# Patient Record
Sex: FEMALE | Race: WHITE | NOT HISPANIC OR LATINO | URBAN - NONMETROPOLITAN AREA
[De-identification: names, ages, dates, MRNs, and addresses within clinical notes are randomized per-mention and may not be internally consistent; named-entity substitution may affect disease eponyms.]

---

## 2018-01-25 ENCOUNTER — HOSPITAL ENCOUNTER (OUTPATIENT)
Dept: LAB | Facility: MEDICAL CENTER | Age: 63
End: 2018-01-25
Attending: NURSE PRACTITIONER
Payer: MEDICARE

## 2018-01-25 LAB
ALBUMIN SERPL BCP-MCNC: 4.4 G/DL (ref 3.2–4.9)
ALBUMIN/GLOB SERPL: 1.1 G/DL
ALP SERPL-CCNC: 83 U/L (ref 30–99)
ALT SERPL-CCNC: 20 U/L (ref 2–50)
ANION GAP SERPL CALC-SCNC: 10 MMOL/L (ref 0–11.9)
AST SERPL-CCNC: 25 U/L (ref 12–45)
BILIRUB SERPL-MCNC: 0.6 MG/DL (ref 0.1–1.5)
BUN SERPL-MCNC: 24 MG/DL (ref 8–22)
CALCIUM SERPL-MCNC: 9.5 MG/DL (ref 8.5–10.5)
CHLORIDE SERPL-SCNC: 106 MMOL/L (ref 96–112)
CHOLEST SERPL-MCNC: 169 MG/DL (ref 100–199)
CO2 SERPL-SCNC: 23 MMOL/L (ref 20–33)
CREAT SERPL-MCNC: 1.12 MG/DL (ref 0.5–1.4)
EST. AVERAGE GLUCOSE BLD GHB EST-MCNC: 117 MG/DL
GLOBULIN SER CALC-MCNC: 3.9 G/DL (ref 1.9–3.5)
GLUCOSE SERPL-MCNC: 96 MG/DL (ref 65–99)
HBA1C MFR BLD: 5.7 % (ref 0–5.6)
HCV AB SER QL: NEGATIVE
HDLC SERPL-MCNC: 56 MG/DL
HIV 1+2 AB+HIV1 P24 AG SERPL QL IA: NON REACTIVE
LDLC SERPL CALC-MCNC: 103 MG/DL
POTASSIUM SERPL-SCNC: 4 MMOL/L (ref 3.6–5.5)
PROT SERPL-MCNC: 8.3 G/DL (ref 6–8.2)
SODIUM SERPL-SCNC: 139 MMOL/L (ref 135–145)
TRIGL SERPL-MCNC: 50 MG/DL (ref 0–149)
TSH SERPL DL<=0.005 MIU/L-ACNC: 1.57 UIU/ML (ref 0.38–5.33)

## 2018-01-25 PROCEDURE — 80061 LIPID PANEL: CPT

## 2018-01-25 PROCEDURE — 87389 HIV-1 AG W/HIV-1&-2 AB AG IA: CPT

## 2018-01-25 PROCEDURE — 83036 HEMOGLOBIN GLYCOSYLATED A1C: CPT

## 2018-01-25 PROCEDURE — 36415 COLL VENOUS BLD VENIPUNCTURE: CPT

## 2018-01-25 PROCEDURE — 80053 COMPREHEN METABOLIC PANEL: CPT

## 2018-01-25 PROCEDURE — 84443 ASSAY THYROID STIM HORMONE: CPT

## 2018-01-25 PROCEDURE — 86803 HEPATITIS C AB TEST: CPT

## 2018-09-18 ENCOUNTER — TELEPHONE (OUTPATIENT)
Dept: CARDIOLOGY | Facility: MEDICAL CENTER | Age: 63
End: 2018-09-18

## 2018-09-18 NOTE — TELEPHONE ENCOUNTER
LVM asking patient to call back into office to find out if she has had any recent blood work done or if she has ever seen a prior cardiologist for cardiac care. Patient has a NP apt. With Dr. Saunders in [place] on 9/20/2018 @ 10:00am*BESSY

## 2018-09-19 NOTE — TELEPHONE ENCOUNTER
Received call back from patient. Patient states she has not had any recent blood work done or any recent cardiac testing done. All she has been having done in regards to labs have been INR checks.*BESSY

## 2018-09-20 ENCOUNTER — OFFICE VISIT (OUTPATIENT)
Dept: CARDIOLOGY | Facility: PHYSICIAN GROUP | Age: 63
End: 2018-09-20
Payer: COMMERCIAL

## 2018-09-20 VITALS
OXYGEN SATURATION: 97 % | HEIGHT: 62 IN | DIASTOLIC BLOOD PRESSURE: 80 MMHG | BODY MASS INDEX: 40.67 KG/M2 | SYSTOLIC BLOOD PRESSURE: 142 MMHG | HEART RATE: 70 BPM | WEIGHT: 221 LBS

## 2018-09-20 DIAGNOSIS — I82.503 CHRONIC DEEP VEIN THROMBOSIS (DVT) OF BOTH LOWER EXTREMITIES, UNSPECIFIED VEIN (HCC): ICD-10-CM

## 2018-09-20 PROBLEM — I82.409 DVT (DEEP VENOUS THROMBOSIS) (HCC): Status: ACTIVE | Noted: 2018-09-20

## 2018-09-20 PROCEDURE — 99204 OFFICE O/P NEW MOD 45 MIN: CPT | Performed by: INTERNAL MEDICINE

## 2018-09-20 RX ORDER — AMLODIPINE BESYLATE 10 MG/1
TABLET ORAL
Refills: 3 | COMMUNITY
Start: 2018-07-26

## 2018-09-20 ASSESSMENT — ENCOUNTER SYMPTOMS
SPUTUM PRODUCTION: 0
NEUROLOGICAL NEGATIVE: 1
PND: 0
GASTROINTESTINAL NEGATIVE: 1
BRUISES/BLEEDS EASILY: 0
CONSTITUTIONAL NEGATIVE: 1
MUSCULOSKELETAL NEGATIVE: 1
ORTHOPNEA: 0
CHILLS: 0
SHORTNESS OF BREATH: 0
WEAKNESS: 0
LOSS OF CONSCIOUSNESS: 0
DIZZINESS: 0
WHEEZING: 0
RESPIRATORY NEGATIVE: 1
FEVER: 0
HEMOPTYSIS: 0
SORE THROAT: 0
EYES NEGATIVE: 1
COUGH: 0
CLAUDICATION: 0
PALPITATIONS: 0
CARDIOVASCULAR NEGATIVE: 1
STRIDOR: 0

## 2018-09-20 NOTE — PROGRESS NOTES
No chief complaint on file.      Subjective:   Leta Alegria is a 62 y.o. female who presents today as a new consultation for blood clots and INR monitoring.  She is a 62-year-old female who had her first blood clot in the setting of her knee replacement surgery approximately 10 years ago.  She continued to have bilateral DVTs per her report and was eventually tested for factor V Leiden deficiency which was positive.  She also has a family history of blood clotting disorders in her mother.  She has been on Coumadin since that time.  She has never had a blood clot that was not provoked.  She also has hyperlipidemia which is borderline controlled as well as high blood pressure which is controlled.  She does not smoke.    Past Medical History:   Diagnosis Date   • Chronic anticoagulation 6/28/2010   • Knee pain, bilateral 6/28/2010   • Pure hypercholesterolemia 6/28/2010   • Shoulder pain, right 6/28/2010     Past Surgical History:   Procedure Laterality Date   • KNEE REVISION TOTAL  6/5/08    Performed by FERNANDA SORENSEN at SURGERY [facility] ORS   • KNEE REVISION TOTAL  6/5/08    Performed by FERNANDA SORENSEN at SURGERY [facility] ORS   • KNEE ARTHROPLASTY TOTAL  4/2006    right   • KNEE ARTHROPLASTY TOTAL  12/2002    left   • KNEE ARTHROPLASTY TOTAL  12/2001    left   • APPENDECTOMY     • CERVICAL FUSION POSTERIOR      C4-6   • [place] BY LAPAROSCOPY     • HYSTERECTOMY, TOTAL ABDOMINAL      with bso due to bleeding   • ROTATOR CUFF REPAIR      left   • TMJ ARTHROSCOPY      bilateral   • TONSILLECTOMY     • VENTRAL HERNIA REPAIR       Family History   Problem Relation Age of Onset   • Lung Disease Mother         Emphysema   • Heart Disease Mother         CHF   • Arthritis Father    • Cancer Father         lung cancer     Social History     Social History   • Marital status:      Spouse name: N/A   • Number of children: N/A   • Years of education: N/A     Occupational History   • Not on file.     Social  History Main Topics   • Smoking status: Never Smoker   • Smokeless tobacco: Never Used   • Alcohol use No   • Drug use: No   • Sexual activity: No      Comment:      Other Topics Concern   • Not on file     Social History Narrative   • No narrative on file     Allergies   Allergen Reactions   • Claritin [Loratadine]    • Iodex [Iodine-Kelp]    • Lovenox [Enoxaparin Sodium]    • Valium [Diazepam]      Outpatient Encounter Prescriptions as of 9/20/2018   Medication Sig Dispense Refill   • amLODIPine (NORVASC) 10 MG Tab TAKE 1 TABLET BY MOUTH DAILY  BRING ALL MEDICATIONS TO EVERY APPOINTMENT  3   • KETOPROFEN 0.5 Inches by Does not apply route as needed. For pain      • LISINOPRIL 20 MG TABS Take 20 mg by mouth every day.     • LOVASTATIN 20 MG TABS Take 20 mg by mouth every evening.     • COUMADIN 5 MG TABS Take 5 mg by mouth every day.     • WARFARIN 1 MG TABS Take 1 mg by mouth every day.     • TIZANIDINE 2 MG tablet Take 2 mg by mouth 3 times a day.     • AMITRIPTYLINE 25 MG TABS Take 25 mg by mouth every evening.     • NORCO  MG TABS Take 1-2 Tabs by mouth every 6 hours as needed for Mild Pain.     • LIDODERM 5 % PTCH Apply 1 Patch to skin as directed every 24 hours.     • amoxicillin (AMOXIL) 875 MG tablet Take 1 Tab by mouth 2 times a day. 20 Tab 0   • fluoxetine (PROZAC) 10 MG CAPS Take 10 mg by mouth every day.     • GABAPENTIN 300 MG CAPS Take 300 mg by mouth 3 times a day.     • METHADONE 10 MG TABS Take 10 mg by mouth every four hours as needed for Mild Pain.       No facility-administered encounter medications on file as of 9/20/2018.      Review of Systems   Constitutional: Negative.  Negative for chills, fever and malaise/fatigue.   HENT: Negative.  Negative for sore throat.    Eyes: Negative.    Respiratory: Negative.  Negative for cough, hemoptysis, sputum production, shortness of breath, wheezing and stridor.    Cardiovascular: Negative.  Negative for chest pain, palpitations, orthopnea,  "claudication, leg swelling and PND.   Gastrointestinal: Negative.    Genitourinary: Negative.    Musculoskeletal: Negative.    Skin: Negative.    Neurological: Negative.  Negative for dizziness, loss of consciousness and weakness.   Endo/Heme/Allergies: Negative.  Does not bruise/bleed easily.   All other systems reviewed and are negative.       Objective:   /80 (BP Location: Right arm, Patient Position: Sitting, BP Cuff Size: Adult)   Pulse 70   Ht 1.575 m (5' 2\")   Wt 100.2 kg (221 lb)   SpO2 97%   BMI 40.42 kg/m²     Physical Exam   Constitutional: She appears well-developed and well-nourished. No distress.   HENT:   Head: Normocephalic and atraumatic.   Right Ear: External ear normal.   Left Ear: External ear normal.   Nose: Nose normal.   Mouth/Throat: No oropharyngeal exudate.   Eyes: Pupils are equal, round, and reactive to light. Conjunctivae and EOM are normal. Right eye exhibits no discharge. Left eye exhibits no discharge. No scleral icterus.   Neck: Neck supple. No JVD present.   Cardiovascular: Normal rate, regular rhythm and intact distal pulses.  Exam reveals no gallop and no friction rub.    No murmur heard.  Pulmonary/Chest: Effort normal. No stridor. No respiratory distress. She has no wheezes. She has no rales. She exhibits no tenderness.   Abdominal: Soft. She exhibits no distension. There is no guarding.   Musculoskeletal: Normal range of motion. She exhibits no edema, tenderness or deformity.   Neurological: She is alert. She has normal reflexes. She displays normal reflexes. No cranial nerve deficit. She exhibits normal muscle tone. Coordination normal.   Skin: Skin is warm and dry. No rash noted. She is not diaphoretic. No erythema. No pallor.   Psychiatric: She has a normal mood and affect. Her behavior is normal. Judgment and thought content normal.   Nursing note and vitals reviewed.      Assessment:     1. Chronic deep vein thrombosis (DVT) of both lower extremities, " unspecified vein (HCC)         Medical Decision Making:  Today's Assessment / Status / Plan:     62-year-old female with what clots in the setting of surgery and immobilization which would be a provoked DVT.  She has never had an unprovoked DVT and therefore the finding of the factor V Leiden may represent a red herring in this case.  I would like her to see hematology to further discuss whether not stopping the Coumadin would be appropriate for her.  I will also refer her to the Coumadin management clinic for management of her INRs or consideration of switching to an oral anticoagulant.  She does not need follow-up in cardiology clinic.    Thank for you allowing me to take part in your patient's care, please call should you have any questions or would like to discuss this patient.

## 2018-09-20 NOTE — LETTER
[facility] [address]  Phone: [phone]  Fax: [phone]              Leta Alegria  1955    Encounter Date: 9/20/2018    Florentino Saunders M.D.          PROGRESS NOTE:  No notes on file      Pcp Pt States None

## 2018-09-21 DIAGNOSIS — Z79.01 CHRONIC ANTICOAGULATION: ICD-10-CM

## 2018-09-28 ENCOUNTER — ANTICOAGULATION MONITORING (OUTPATIENT)
Dept: VASCULAR LAB | Facility: MEDICAL CENTER | Age: 63
End: 2018-09-28

## 2018-09-28 DIAGNOSIS — Z79.01 CHRONIC ANTICOAGULATION: ICD-10-CM

## 2018-09-28 DIAGNOSIS — I82.503 CHRONIC DEEP VEIN THROMBOSIS (DVT) OF BOTH LOWER EXTREMITIES, UNSPECIFIED VEIN (HCC): ICD-10-CM

## 2018-09-28 LAB — INR PPP: 1.6 (ref 2–3.5)

## 2018-09-28 RX ORDER — WARFARIN SODIUM 5 MG/1
5 TABLET ORAL DAILY
Qty: 30 TAB | Refills: 3 | Status: SHIPPED | OUTPATIENT
Start: 2018-09-28 | End: 2019-01-17 | Stop reason: SDUPTHER

## 2018-09-28 RX ORDER — WARFARIN SODIUM 1 MG/1
2-4 TABLET ORAL DAILY
Qty: 60 TAB | Refills: 3 | Status: SHIPPED | OUTPATIENT
Start: 2018-09-28 | End: 2019-02-21 | Stop reason: SDUPTHER

## 2018-09-28 NOTE — PROGRESS NOTES
.  Anticoagulation Summary  As of 2018    INR goal:   2.0-3.0   TTR:   --   Today's INR:   1.6!   Warfarin maintenance plan:   7 mg (1 mg x 2 and 5 mg x 1) every day   Weekly warfarin total:   49 mg   Plan last modified:   Leonarda Curry, PharmD (2018)   Next INR check:   10/5/2018   Target end date:   Indefinite    Indications    DVT (deep venous thrombosis) (HCC) [I82.409]  Chronic anticoagulation [Z79.01]             Anticoagulation Episode Summary     INR check location:       Preferred lab:       Send INR reminders to:       Comments:         Anticoagulation Care Providers     Provider Role Specialty Phone number    Florentino Saunders M.D. Referring Cardiology [phone]    [facility] Anticoagulation Services Responsible  [phone]        Anticoagulation Patient Findings    Pt hx - Factor V Liden, DVT, pt's been on warfarin prior to coming to [facility].    Pt was very short on the phone. States that she was at a  and missed a few doses.   States that she uses BRAND Coumadin 5mg tablets and Warfarin 1mg tablets. Normally takes 7mg daily.     Will take 10mg x1 today and then resume her normal dosing. She will retest her INR in 1 week.     She has an appt to establish care in [facility] on 10/12/18.    Leonarda Curry, PharmD

## 2018-10-03 ENCOUNTER — TELEPHONE (OUTPATIENT)
Dept: HEMATOLOGY ONCOLOGY | Facility: MEDICAL CENTER | Age: 63
End: 2018-10-03

## 2018-10-03 NOTE — TELEPHONE ENCOUNTER
We received a referral for hematology from [employer] for chronic DVTs.  I spoke to the practice manager, Nelda Rodriguez and at this time we do not accept workman's compensation cases for hematology.  Faxed a note to Ruthy Javed with this updated information and advised them to re-route to another office.

## 2018-10-08 LAB — INR PPP: 1.6 (ref 2–3.5)

## 2018-10-08 RX ORDER — WARFARIN SODIUM 1 MG/1
2-4 TABLET ORAL DAILY
Qty: 60 TAB | Refills: 3 | Status: CANCELLED | OUTPATIENT
Start: 2018-10-08

## 2018-10-09 ENCOUNTER — ANTICOAGULATION MONITORING (OUTPATIENT)
Dept: VASCULAR LAB | Facility: MEDICAL CENTER | Age: 63
End: 2018-10-09

## 2018-10-09 DIAGNOSIS — Z79.01 CHRONIC ANTICOAGULATION: ICD-10-CM

## 2018-10-09 DIAGNOSIS — I82.503 CHRONIC DEEP VEIN THROMBOSIS (DVT) OF BOTH LOWER EXTREMITIES, UNSPECIFIED VEIN (HCC): ICD-10-CM

## 2018-10-09 NOTE — PROGRESS NOTES
Anticoagulation Summary  As of 10/9/2018    INR goal:   2.0-3.0   TTR:   --   Today's INR:   1.6! (10/8/2018)   Warfarin maintenance plan:   10 mg (5 mg x 2) on Tue, Thu; 7 mg (1 mg x 2 and 5 mg x 1) all other days   Weekly warfarin total:   55 mg   Plan last modified:   Marissa Nayak (10/9/2018)   Next INR check:   10/15/2018   Target end date:   Indefinite    Indications    DVT (deep venous thrombosis) (HCC) [I82.409]  Chronic anticoagulation [Z79.01]             Anticoagulation Episode Summary     INR check location:       Preferred lab:       Send INR reminders to:       Comments:         Anticoagulation Care Providers     Provider Role Specialty Phone number    Florentino Saunders M.D. Referring Cardiology [phone]    [facility] Responsible  [phone]        Anticoagulation Patient Findings   Left voicemail message to report a sub therapeutic INR of 1.6.  Pt to INCREASE WEEKLY DOSE BY 12%continue with current warfarin dosing regimen. Requested pt contact the clinic for any s/s of unusual bleeding, bruising, clotting or any changes to diet or medication.  Follow up in 1 weeks, to reduce the risk of adverse events related to this high risk medication, warfarin.    Marissa Nayak, Clinical Pharmacist

## 2018-10-12 ENCOUNTER — ANTICOAGULATION VISIT (OUTPATIENT)
Dept: MEDICAL GROUP | Facility: PHYSICIAN GROUP | Age: 63
End: 2018-10-12
Payer: COMMERCIAL

## 2018-10-12 DIAGNOSIS — Z79.01 CHRONIC ANTICOAGULATION: ICD-10-CM

## 2018-10-12 PROCEDURE — 99999 PR NO CHARGE: CPT | Performed by: FAMILY MEDICINE

## 2018-10-12 NOTE — PROGRESS NOTES
OP Anticoagulation Service Note    Date: 10/12/2018  There were no vitals filed for this visit.    Anticoagulation Summary  As of 10/12/2018    INR goal:   2.0-3.0   TTR:   --   Today's INR:      Warfarin maintenance plan:   10 mg (5 mg x 2) on Tue, Thu; 7 mg (1 mg x 2 and 5 mg x 1) all other days   Weekly warfarin total:   55 mg   Plan last modified:   Marissa M Filter (10/9/2018)   Next INR check:   10/15/2018   Target end date:   Indefinite    Indications    DVT (deep venous thrombosis) ([facility]) [I82.409]  Chronic anticoagulation [Z79.01]             Anticoagulation Episode Summary     INR check location:       Preferred lab:       Send INR reminders to:       Comments:         Anticoagulation Care Providers     Provider Role Specialty Phone number    Florentino Saunders M.D. Referring Cardiology [phone]    [facility] Responsible  [phone]        Anticoagulation Patient Findings      HPI:   Leta Alegria seen in clinic today, they are here today for a INR check on anticoagulation therapy with warfarin because they have deep vein thrombosis, we are also going to assess if a DOAC is appropriate to switch her to.    The reason for today's visit is to prevent morbidity and mortality from a blood clot and to reduce the risk of bleeding while on a anticoagulant.     Additional education provided today regarding reducing bleed risk and dietary constraints:  About how vitamin K and foods work with warfarin and the bleeding risk on a anticoagulant     Any upcoming procedures:   none    Confirmed warfarin dosing regimen  Interval Changes with foods rich in vitamin K: No  Interval Changes in ETOH:   No  Interval Changes in smoking status:  No  Interval Changes in medication:  No   Cost restriction:  No    S/S of bleeding or bruising:  No  Signs/symptoms  thrombosis since the last appt:  No  Bleed risk is:  moderate,     3 vitals included with today's appt : She declined vitals today.  (BP, HR,  weight, ht, RR)     Assessment:   She declined a point-of-care finger poke today.  She prefers going to the lab for a venipuncture.  She will get another INR on Monday.  She is resistant to using a DOAC.  We talked about the pros and cons her daughter is a physician and she will ask her daughter about it.    She is a good candidate to use a DOAC.  Her creatinine clearance is within normal limits, no drug interactions with Eliquis or Xarelto.    They have a TTR of 0 which is not at target (TTR target/goal is 100%) and requires close follow up to prevent a adverse event (the lower the TTR the higher risk of clots, strokes, or bleeding).       Plan:  Continue the weekly warfarin dose as noted    Follow up:  Follow up appointment in 1 week via the lab      Other info:  Pt educated to contact our clinic with any changes in medications or s/s of bleeding or thrombosis    CHEST guidelines recommend frequent INR monitoring at regular intervals (a few days up to a max of 12 weeks) to ensure they are on the proper dose of warfarin and not having any complications from therapy.  INRs can dramatically change over a short time period due to diet, medications, and medical conditions.

## 2018-10-18 ENCOUNTER — TELEPHONE (OUTPATIENT)
Dept: CARDIOLOGY | Facility: MEDICAL CENTER | Age: 63
End: 2018-10-18

## 2018-10-18 NOTE — TELEPHONE ENCOUNTER
----- Message from Lila Weber sent at 10/18/2018  2:33 PM PDT -----  Regarding: REFERRAL TO HEMATOLOGY ONCOLOGY  Dr. Nicky Altamirano referred this patient to Hematology/Oncology through [employer] and the Workers[employer] comp company tried to make appointments with at least 5 Hematology/Oncology offices and they don't take [employer].  Katherine at the [employer] office wants to know if Dr. Saunders can call [facility] Oncology and see if they will take W/[facility].  Any suggestions? If you have any other questions please let me know.     Please let me know the status as I need to let them know what to do.     Thanks,     Lila   [phone]

## 2018-10-25 ENCOUNTER — ANTICOAGULATION MONITORING (OUTPATIENT)
Dept: VASCULAR LAB | Facility: MEDICAL CENTER | Age: 63
End: 2018-10-25

## 2018-10-25 DIAGNOSIS — Z79.01 CHRONIC ANTICOAGULATION: ICD-10-CM

## 2018-10-25 LAB — INR PPP: 1.9 (ref 2–3.5)

## 2018-10-25 NOTE — TELEPHONE ENCOUNTER
[employer] calling about referral   Received: Today   Message Contents   CELESTINE Olvera/Adarsh Murphy at the [employer] office is calling again about referral. She said she has left several message for Josey Sharp and Eduardo Tan and no one has called her back. She is asking for a call back from Dr. Saunders about the referral. She can be reached at [phone].      ===========================  Call printed and given to Eduardo Tan for follow up.

## 2018-10-25 NOTE — PROGRESS NOTES
Anticoagulation Summary  As of 10/25/2018    INR goal:   2.0-3.0   TTR:   0.0 % (2.4 wk)   Today's INR:   1.9!   Warfarin maintenance plan:   10 mg (5 mg x 2) on Tue, Thu; 7 mg (1 mg x 2 and 5 mg x 1) all other days   Weekly warfarin total:   55 mg   Plan last modified:   Marissa M Filter (10/9/2018)   Next INR check:   11/8/2018   Target end date:   Indefinite    Indications    DVT (deep venous thrombosis) ([facility]) [I82.409]  Chronic anticoagulation [Z79.01]             Anticoagulation Episode Summary     INR check location:       Preferred lab:       Send INR reminders to:       Comments:         Anticoagulation Care Providers     Provider Role Specialty Phone number    Florentino Saunders M.D. Referring Cardiology [phone]    [facility] Responsible  [phone]        Anticoagulation Patient Findings  Patient Findings     Negatives:   Signs/symptoms of thrombosis, Signs/symptoms of bleeding, Laboratory test error suspected, Change in health, Change in alcohol use, Change in activity, Upcoming invasive procedure, Emergency department visit, Upcoming dental procedure, Missed doses, Extra doses, Change in medications, Change in diet/appetite, Hospital admission, Bruising, Other complaints        Spoke with the patient on the phone today, reporting a SUB-therapeutic INR Of 1.9.  Confirmed the current warfarin dosing regimen and patient compliance. Patient denies any interval changes to diet and/or medications. Patient denies any signs/symptoms of bleeding or clotting.  Patient instructed to take additional 2.5mg along with current dose (for a total of 12.5mg) TONIGHT ONLY, then to resume her current dosing regimen. Patient asked to follow up again in 2 weeks.    Jenise ChandraD

## 2018-11-21 ENCOUNTER — ANTICOAGULATION MONITORING (OUTPATIENT)
Dept: VASCULAR LAB | Facility: MEDICAL CENTER | Age: 63
End: 2018-11-21

## 2018-11-21 DIAGNOSIS — Z79.01 CHRONIC ANTICOAGULATION: ICD-10-CM

## 2018-11-21 LAB — INR PPP: 1.7 (ref 2–3.5)

## 2018-11-22 NOTE — PROGRESS NOTES
Anticoagulation Summary  As of 11/21/2018    INR goal:   2.0-3.0   TTR:   0.0 % (1.5 mo)   Today's INR:   1.7!   Warfarin maintenance plan:   10 mg (5 mg x 2) on Tue, Thu, Sat; 7 mg (1 mg x 2 and 5 mg x 1) all other days   Weekly warfarin total:   58 mg   Plan last modified:   Rommel Randall, PharmD (11/21/2018)   Next INR check:   12/5/2018   Target end date:   Indefinite    Indications    DVT (deep venous thrombosis) (HCC) [I82.409]  Chronic anticoagulation [Z79.01]             Anticoagulation Episode Summary     INR check location:       Preferred lab:       Send INR reminders to:       Comments:         Anticoagulation Care Providers     Provider Role Specialty Phone number    Florentino Saunders M.D. Referring Cardiology [phone]    [facility] Anticoagulation Services Responsible  [phone]        Anticoagulation Patient Findings    Left voicemail message to report a subtherapeutic INR of 1.7.  Requested pt contact the clinic for any s/s of unusual bleeding, bruising, clotting or any changes to diet or medication.   Instructed patient to increase weekly warfarin regimen by ~6% as detailed above.  FU 2 weeks.  Rommel Randall, PharmD

## 2018-12-14 ENCOUNTER — ANTICOAGULATION MONITORING (OUTPATIENT)
Dept: VASCULAR LAB | Facility: MEDICAL CENTER | Age: 63
End: 2018-12-14

## 2018-12-14 DIAGNOSIS — Z79.01 CHRONIC ANTICOAGULATION: ICD-10-CM

## 2018-12-14 LAB — INR PPP: 1.5 (ref 2–3.5)

## 2018-12-14 NOTE — PROGRESS NOTES
Anticoagulation Summary  As of 12/14/2018    INR goal:   2.0-3.0   TTR:   0.0 % (2.2 mo)   Today's INR:   1.5!   Warfarin maintenance plan:   10 mg (5 mg x 2) on Mon, Wed, Fri; 8 mg (1 mg x 3 and 5 mg x 1) all other days   Weekly warfarin total:   62 mg   Plan last modified:   Min Butt, Pharmacy Intern (12/14/2018)   Next INR check:   12/28/2018   Target end date:   Indefinite    Indications    DVT (deep venous thrombosis) (HCC) [I82.409]  Chronic anticoagulation [Z79.01]             Anticoagulation Episode Summary     INR check location:       Preferred lab:       Send INR reminders to:       Comments:         Anticoagulation Care Providers     Provider Role Specialty Phone number    Florentino Saunders M.D. Referring Cardiology [phone]    [facility] Anticoagulation Services Responsible  [phone]        Anticoagulation Patient Findings        Tried calling patient but had to leave a message.  INR is sub-therapeutic.   Pt was asked to call us if they have had any s/s of bleeding, or if they have had any medication changes.    Verified current warfarin regimen in the message.   Plan: Bolus today with 12.5 mg (instead of normal 10mg dose), then start new increased warfarin regimen outlined above.  This new regimen represents a 6.9% increase from the previous regimen.         Repeat INR in 2 weeks.    Min Butt 2019 PharmD Candidate

## 2019-01-02 ENCOUNTER — TELEPHONE (OUTPATIENT)
Dept: CARDIOLOGY | Facility: MEDICAL CENTER | Age: 64
End: 2019-01-02

## 2019-01-02 NOTE — TELEPHONE ENCOUNTER
Called and explained that we did not have any imaging of the pt's DVTs. Ron states understanding.

## 2019-01-02 NOTE — TELEPHONE ENCOUNTER
----- Message from Angel Nieves, Med Ass't sent at 1/2/2019 11:48 AM PST -----  Regarding: [employer] needs imaging for RO referral   Contact: [phone]  MEÑO Bustillo pt Ron was referred to Hemotology and they are stating that they need imaging or some sort of proof of DVT in her legs or they will have to cancel her appt. Ron did request a call back at the earliest convenience to [phone]    Thanks!  Angel [phone]

## 2019-01-29 ENCOUNTER — ANTICOAGULATION MONITORING (OUTPATIENT)
Dept: MEDICAL GROUP | Facility: MEDICAL CENTER | Age: 64
End: 2019-01-29

## 2019-01-29 DIAGNOSIS — Z79.01 CHRONIC ANTICOAGULATION: ICD-10-CM

## 2019-01-29 LAB — INR PPP: 1.9 (ref 2–3.5)

## 2019-01-29 NOTE — PROGRESS NOTES
Anticoagulation Summary  As of 1/29/2019    INR goal:   2.0-3.0   TTR:   0.0 % (3.8 mo)   Today's INR:   1.9!   Warfarin maintenance plan:   10 mg (5 mg x 2) on Mon, Wed, Fri; 8 mg (1 mg x 3 and 5 mg x 1) all other days   Weekly warfarin total:   62 mg   Plan last modified:   Min Butt, Pharmacy Intern (12/14/2018)   Next INR check:   2/12/2019   Target end date:   Indefinite    Indications    DVT (deep venous thrombosis) (HCC) [I82.409]  Chronic anticoagulation [Z79.01]             Anticoagulation Episode Summary     INR check location:       Preferred lab:       Send INR reminders to:       Comments:         Anticoagulation Care Providers     Provider Role Specialty Phone number    Florentino Saunders M.D. Referring Cardiology [phone]    [facility] Responsible  [phone]        Anticoagulation Patient Findings      Spoke with patient to report a SUBtherapeutic INR.    Pt instructed to continue with current warfarin dosing regimen, confirms dosing.   Per CHEST guidelines, no dose change warranted if INR is 0.1 points out of range.   Pt denies any s/s of bleeding, bruising, clotting or any changes to medication.    Pt reports she has been eating more cabbage.  Will follow up in 1 week(s).    Was considering dose increase to 10 mg M,W,F; 9 mg all other days (~6% weekly increase) but patient was resistant to that idea. She said she will try to be more consistent with diet and then check back in 1 week and if still SUBtherapeutic she will be willing to change her weekly dose.     Discussed with Marissa Whitman, Pharmacy Intern

## 2019-01-30 RX ORDER — WARFARIN SODIUM 5 MG/1
5-10 TABLET ORAL DAILY
Qty: 60 TAB | Refills: 3 | Status: SHIPPED | OUTPATIENT
Start: 2019-01-30 | End: 2019-02-08 | Stop reason: SDUPTHER

## 2019-02-08 DIAGNOSIS — Z79.01 CHRONIC ANTICOAGULATION: ICD-10-CM

## 2019-02-08 RX ORDER — WARFARIN SODIUM 5 MG/1
5-10 TABLET ORAL DAILY
Qty: 60 TAB | Refills: 3 | Status: SHIPPED | OUTPATIENT
Start: 2019-02-08 | End: 2019-02-21 | Stop reason: SDUPTHER

## 2019-02-21 DIAGNOSIS — Z79.01 CHRONIC ANTICOAGULATION: ICD-10-CM

## 2019-02-21 RX ORDER — WARFARIN SODIUM 5 MG/1
5-10 TABLET ORAL DAILY
Qty: 60 TAB | Refills: 3 | Status: SHIPPED | OUTPATIENT
Start: 2019-02-21 | End: 2019-05-30

## 2019-02-21 RX ORDER — WARFARIN SODIUM 1 MG/1
TABLET ORAL
Qty: 90 TAB | Refills: 1 | Status: SHIPPED | OUTPATIENT
Start: 2019-02-21 | End: 2019-05-28 | Stop reason: SDUPTHER

## 2019-02-25 ENCOUNTER — TELEPHONE (OUTPATIENT)
Dept: VASCULAR LAB | Facility: MEDICAL CENTER | Age: 64
End: 2019-02-25

## 2019-03-02 LAB — INR PPP: 1.5 (ref 2–3.5)

## 2019-03-04 ENCOUNTER — ANTICOAGULATION MONITORING (OUTPATIENT)
Dept: VASCULAR LAB | Facility: MEDICAL CENTER | Age: 64
End: 2019-03-04

## 2019-03-04 DIAGNOSIS — Z79.01 CHRONIC ANTICOAGULATION: ICD-10-CM

## 2019-03-04 NOTE — PROGRESS NOTES
"    Anticoagulation Summary  As of 3/4/2019    INR goal:   2.0-3.0   TTR:   0.0 % (4.8 mo)   INR used for dosin.5! (3/2/2019)   Warfarin maintenance plan:   10 mg (5 mg x 2) every Mon, Wed, Fri; 8 mg (1 mg x 3 and 5 mg x 1) all other days   Weekly warfarin total:   62 mg   Plan last modified:   Terrance Miller, Pharmacy Intern (3/4/2019)   Next INR check:   3/11/2019   Target end date:   Indefinite    Indications    DVT (deep venous thrombosis) (HCC) [I82.409]  Chronic anticoagulation [Z79.01]             Anticoagulation Episode Summary     INR check location:       Preferred lab:       Send INR reminders to:       Comments:         Anticoagulation Care Providers     Provider Role Specialty Phone number    Florentino Saunders M.D. Referring Cardiology [phone]    [facility] Responsible  [phone]        Anticoagulation Patient Findings    Discussed dosing plan with Marissa Nayak PharmD    Spoke with patient this morning to report a SUBtherapeutic INR of 1.5.      Patient has been without her warfarin since Thursday, , patient was able to refill her 1 mg and 5 mg warfarin tablets today. Patient tried to \"stretch\" her warfarin 1 mg, and 5 mg dose as long as she could until she was able to get a refill from her pharmacy.    Patient claimed that the weather and changing pharmacies had been a barrier for her to get her warfarin.     Patient also mentioned that she was taking 7 mg all other days of the week instead of the previously instructed regimen of 8 mg all other days of the week.     Patient instructed to take 10 mg of warfarin , ,  and then 8 mg all other days of the week.     Patient said that she is meeting with the [facility] sometime this week. She claims to have spoken with her provider, and her provider believes warfarin may not be \"working\" for her. She meet with a hematologist prior to next follow up.      Patient denies any s/s of bleeding, bruising, " clotting or any changes to diet or medication.      Will follow up in 1 week(s) on Monday, March 11th     I have reviewed and concur with the above plan     Chele Matias, CorazonD

## 2019-03-09 LAB — INR PPP: 1.8 (ref 2–3.5)

## 2019-03-11 ENCOUNTER — ANTICOAGULATION MONITORING (OUTPATIENT)
Dept: VASCULAR LAB | Facility: MEDICAL CENTER | Age: 64
End: 2019-03-11

## 2019-03-11 DIAGNOSIS — Z79.01 CHRONIC ANTICOAGULATION: ICD-10-CM

## 2019-03-12 NOTE — PROGRESS NOTES
Anticoagulation Summary  As of 3/11/2019    INR goal:   2.0-3.0   TTR:   0.0 % (5.1 mo)   INR used for dosin.8! (3/9/2019)   Warfarin maintenance plan:   10 mg (5 mg x 2) every Mon, Wed, Fri; 8 mg (1 mg x 3 and 5 mg x 1) all other days   Weekly warfarin total:   62 mg   Plan last modified:   Terrance Miller, Pharmacy Intern (3/12/2019)   Next INR check:   3/19/2019   Target end date:   Indefinite    Indications    DVT (deep venous thrombosis) ([facility]) [I82.409]  Chronic anticoagulation [Z79.01]             Anticoagulation Episode Summary     INR check location:       Preferred lab:       Send INR reminders to:       Comments:         Anticoagulation Care Providers     Provider Role Specialty Phone number    Florentino Saunders M.D. Referring Cardiology [phone]    [facility] Anticoagulation Services Responsible  [phone]        Anticoagulation Patient Findings    Discussed dosing plan with Marissa Nayak PharmD    Spoke with patient this morning to report a SUBtherapeutic INR of 1.8      Patient instructed BOLUS with 12 mg tonight only and to continue with current warfarin dosing regimen, confirms dosing.     Patient denies any s/s of bleeding, bruising, clotting or any changes to medication.      Patient was hesitant to bolus and to increase her weekly warfarin dose. Patient claims that her cardiologist is against adjusting her dose periodically.     Patient did admit that she had a large serving of bruDebt Resolvets this last weekend and believes that has caused her INR to drop. Instructed patient to contact our clinic if she had questions/concerns @ [phone]    Will follow up in 1 week(s) on Monday,      Terrance Miller, Pharmacy Intern

## 2019-03-28 ENCOUNTER — ANTICOAGULATION MONITORING (OUTPATIENT)
Dept: VASCULAR LAB | Facility: MEDICAL CENTER | Age: 64
End: 2019-03-28

## 2019-03-28 DIAGNOSIS — Z79.01 CHRONIC ANTICOAGULATION: ICD-10-CM

## 2019-03-28 LAB — INR PPP: 2.9 (ref 2–3.5)

## 2019-03-28 NOTE — PROGRESS NOTES
Anticoagulation Summary  As of 3/28/2019    INR goal:   2.0-3.0   TTR:   9.1 % (5.7 mo)   INR used for dosin.9 (3/28/2019)   Warfarin maintenance plan:   10 mg (5 mg x 2) every Mon, Wed, Fri; 8 mg (1 mg x 3 and 5 mg x 1) all other days   Weekly warfarin total:   62 mg   Plan last modified:   Terrance Miller, Pharmacy Intern (3/12/2019)   Next INR check:   2019   Target end date:   Indefinite    Indications    DVT (deep venous thrombosis) ([facility]) [I82.409]  Chronic anticoagulation [Z79.01]             Anticoagulation Episode Summary     INR check location:       Preferred lab:       Send INR reminders to:       Comments:         Anticoagulation Care Providers     Provider Role Specialty Phone number    Florentino Saunders M.D. Referring Cardiology [phone]    [facility] Anticoagulation Services Responsible  [phone]        Anticoagulation Patient Findings   Left voicemail message to report a   therapeutic INR of 2.9.  Pt to continue with current warfarin dosing regimen. Requested pt contact the clinic for any s/s of unusual bleeding, bruising, clotting or any changes to diet or medication.  Follow up in 3 weeks, to reduce the risk of adverse events related to this high risk medication, warfarin.    Marissa Nayak, Clinical Pharmacist

## 2019-04-30 ENCOUNTER — ANTICOAGULATION MONITORING (OUTPATIENT)
Dept: VASCULAR LAB | Facility: MEDICAL CENTER | Age: 64
End: 2019-04-30

## 2019-04-30 DIAGNOSIS — Z79.01 CHRONIC ANTICOAGULATION: ICD-10-CM

## 2019-04-30 DIAGNOSIS — I82.499 DEEP VEIN THROMBOSIS (DVT) OF OTHER VEIN OF LOWER EXTREMITY, UNSPECIFIED CHRONICITY, UNSPECIFIED LATERALITY (HCC): ICD-10-CM

## 2019-04-30 LAB — INR PPP: 1.7 (ref 2–3.5)

## 2019-04-30 NOTE — PROGRESS NOTES
Anticoagulation Summary  As of 2019    INR goal:   2.0-3.0   TTR:   19.8 % (6.8 mo)   INR used for dosin.70! (2019)   Warfarin maintenance plan:   10 mg (5 mg x 2) every Mon, Wed, Fri; 8 mg (1 mg x 3 and 5 mg x 1) all other days   Weekly warfarin total:   62 mg   Plan last modified:   Terrance Miller, Pharmacy Intern (3/12/2019)   Next INR check:   2019   Target end date:   Indefinite    Indications    DVT (deep venous thrombosis) ([facility]) [I82.409]  Chronic anticoagulation [Z79.01]             Anticoagulation Episode Summary     INR check location:       Preferred lab:       Send INR reminders to:       Comments:         Anticoagulation Care Providers     Provider Role Specialty Phone number    Florentino Saunders M.D. Referring Cardiology [phone]    [facility] Anticoagulation Services Responsible  [phone]        Anticoagulation Patient Findings      INR  sub-therapeutic.   Left a voice message for the patient, asked patient to please call the anticoagulation clinic if they have any signs/symptoms of bleeding and/or thrombosis or any changes to diet or medications.    Follow up appointment in 2 week(s)    10mg today Continue weekly warfarin dose as noted      Chele Matias, PharmD

## 2019-05-17 ENCOUNTER — TELEPHONE (OUTPATIENT)
Dept: VASCULAR LAB | Facility: MEDICAL CENTER | Age: 64
End: 2019-05-17

## 2019-05-23 ENCOUNTER — ANTICOAGULATION MONITORING (OUTPATIENT)
Dept: VASCULAR LAB | Facility: MEDICAL CENTER | Age: 64
End: 2019-05-23

## 2019-05-23 DIAGNOSIS — Z79.01 CHRONIC ANTICOAGULATION: ICD-10-CM

## 2019-05-23 DIAGNOSIS — I82.499 DEEP VEIN THROMBOSIS (DVT) OF OTHER VEIN OF LOWER EXTREMITY, UNSPECIFIED CHRONICITY, UNSPECIFIED LATERALITY (HCC): ICD-10-CM

## 2019-05-23 LAB — INR PPP: 1.9 (ref 2–3.5)

## 2019-05-23 NOTE — PROGRESS NOTES
Anticoagulation Summary  As of 2019    INR goal:   2.0-3.0   TTR:   17.8 % (7.6 mo)   INR used for dosin.90! (2019)   Warfarin maintenance plan:   10 mg (5 mg x 2) every Mon, Wed, Fri; 8 mg (1 mg x 3 and 5 mg x 1) all other days   Weekly warfarin total:   62 mg   Plan last modified:   Terrance Miller, Pharmacy Intern (3/12/2019)   Next INR check:   2019   Target end date:   Indefinite    Indications    DVT (deep venous thrombosis) ([facility]) [I82.409]  Chronic anticoagulation [Z79.01]             Anticoagulation Episode Summary     INR check location:       Preferred lab:       Send INR reminders to:       Comments:         Anticoagulation Care Providers     Provider Role Specialty Phone number    Florentino Saunders M.D. Referring Cardiology [phone]    [facility] Anticoagulation Services Responsible  [phone]        Anticoagulation Patient Findings        Spoke to patient on the phone.   INR  sub-therapeutic.   Denies signs/symptoms of bleeding and/or thrombosis.   Denies changes to diet or medications.   Follow up appointment in 1 week(s).    9mg today then continue weekly warfarin dose as noted      Chele Matias, PharmD

## 2019-05-28 RX ORDER — WARFARIN SODIUM 1 MG/1
TABLET ORAL
Qty: 270 TAB | Refills: 1 | Status: SHIPPED | OUTPATIENT
Start: 2019-05-28 | End: 2019-11-12 | Stop reason: SDUPTHER

## 2019-05-30 DIAGNOSIS — Z79.01 CHRONIC ANTICOAGULATION: ICD-10-CM

## 2019-05-30 RX ORDER — WARFARIN SODIUM 5 MG/1
TABLET ORAL
Qty: 60 TAB | Refills: 0 | Status: SHIPPED | OUTPATIENT
Start: 2019-05-30 | End: 2019-07-16

## 2019-06-18 ENCOUNTER — ANTICOAGULATION MONITORING (OUTPATIENT)
Dept: VASCULAR LAB | Facility: MEDICAL CENTER | Age: 64
End: 2019-06-18

## 2019-06-18 DIAGNOSIS — I82.499 DEEP VEIN THROMBOSIS (DVT) OF OTHER VEIN OF LOWER EXTREMITY, UNSPECIFIED CHRONICITY, UNSPECIFIED LATERALITY (HCC): ICD-10-CM

## 2019-06-18 DIAGNOSIS — Z79.01 CHRONIC ANTICOAGULATION: ICD-10-CM

## 2019-06-18 LAB — INR PPP: 2.1 (ref 2–3.5)

## 2019-06-18 NOTE — PROGRESS NOTES
Anticoagulation Summary  As of 2019    INR goal:   2.0-3.0   TTR:   21.1 % (8.4 mo)   INR used for dosin.10 (2019)   Warfarin maintenance plan:   10 mg (5 mg x 2) every Mon, Wed, Fri; 8 mg (1 mg x 3 and 5 mg x 1) all other days   Weekly warfarin total:   62 mg   No change documented:   Rosy Friedman, Med Ass't   Plan last modified:   Terrance Miller, Pharmacy Intern (3/12/2019)   Next INR check:   2019   Target end date:   Indefinite    Indications    DVT (deep venous thrombosis) ([facility]) [I82.409]  Chronic anticoagulation [Z79.01]             Anticoagulation Episode Summary     INR check location:       Preferred lab:       Send INR reminders to:       Comments:         Anticoagulation Care Providers     Provider Role Specialty Phone number    Florentino Saunders M.D. Referring Cardiology [phone]    [facility] Anticoagulation Services Responsible  [phone]        Anticoagulation Patient Findings  Patient Findings     Negatives:   Signs/symptoms of thrombosis, Signs/symptoms of bleeding, Laboratory test error suspected, Change in health, Change in alcohol use, Change in activity, Upcoming invasive procedure, Emergency department visit, Upcoming dental procedure, Missed doses, Extra doses, Change in medications, Change in diet/appetite, Hospital admission, Bruising, Other complaints        Spoke with patient to report a therapeutic INR.  Pt instructed to continue with current warfarin dosing regimen. Pt denies any s/s of bleeding, bruising, clotting or any changes to diet or medication.  Will follow up in 2 weeks.    Rosy Friedman, Med Ass't      I have reviewed and concur with the above plan     Chele Matias, PharmD

## 2019-07-03 ENCOUNTER — ANTICOAGULATION MONITORING (OUTPATIENT)
Dept: VASCULAR LAB | Facility: MEDICAL CENTER | Age: 64
End: 2019-07-03

## 2019-07-03 DIAGNOSIS — Z79.01 CHRONIC ANTICOAGULATION: ICD-10-CM

## 2019-07-03 LAB — INR PPP: 2.8 (ref 2–3.5)

## 2019-07-30 ENCOUNTER — ANTICOAGULATION MONITORING (OUTPATIENT)
Dept: VASCULAR LAB | Facility: MEDICAL CENTER | Age: 64
End: 2019-07-30

## 2019-07-30 DIAGNOSIS — Z79.01 CHRONIC ANTICOAGULATION: ICD-10-CM

## 2019-07-30 NOTE — PROGRESS NOTES
Anticoagulation clinic    Reminder voice message for patient regarding getting INR done ASAP for anticoagulation clinic.     Chele Matias, PharmD

## 2019-08-20 ENCOUNTER — ANTICOAGULATION MONITORING (OUTPATIENT)
Dept: VASCULAR LAB | Facility: MEDICAL CENTER | Age: 64
End: 2019-08-20

## 2019-08-20 DIAGNOSIS — Z79.01 CHRONIC ANTICOAGULATION: ICD-10-CM

## 2019-08-21 ENCOUNTER — ANTICOAGULATION MONITORING (OUTPATIENT)
Dept: VASCULAR LAB | Facility: MEDICAL CENTER | Age: 64
End: 2019-08-21

## 2019-08-21 DIAGNOSIS — Z79.01 CHRONIC ANTICOAGULATION: ICD-10-CM

## 2019-08-21 LAB — INR PPP: 2.6 (ref 2–3.5)

## 2019-08-21 NOTE — PROGRESS NOTES
Anticoagulation Summary  As of 2019    INR goal:   2.0-3.0   TTR:   37.0 % (10.6 mo)   INR used for dosin.60 (2019)   Warfarin maintenance plan:   10 mg (5 mg x 2) every Mon, Wed, Fri; 8 mg (1 mg x 3 and 5 mg x 1) all other days   Weekly warfarin total:   62 mg   Plan last modified:   Terrance Miller, Pharmacy Intern (3/12/2019)   Next INR check:   10/2/2019   Priority:   Acute   Target end date:   Indefinite    Indications    DVT (deep venous thrombosis) ([facility]) [I82.409]  Chronic anticoagulation [Z79.01]             Anticoagulation Episode Summary     INR check location:       Preferred lab:       Send INR reminders to:       Comments:         Anticoagulation Care Providers     Provider Role Specialty Phone number    Florentino Saunders M.D. Referring Cardiology [phone]    [facility] Anticoagulation Services Responsible  [phone]        Anticoagulation Patient Findings    Left voicemail message to report a therapeutic INR of 2.6.  Pt to continue with current warfarin dosing regimen. Requested pt contact the clinic for any s/s of unusual bleeding, bruising, clotting or any changes to diet or medication. FU 6 weeks.  Rommel Randall, PharmD, BCACP

## 2019-09-13 DIAGNOSIS — Z79.01 CHRONIC ANTICOAGULATION: ICD-10-CM

## 2019-09-27 DIAGNOSIS — I82.499 DEEP VEIN THROMBOSIS (DVT) OF OTHER VEIN OF LOWER EXTREMITY, UNSPECIFIED CHRONICITY, UNSPECIFIED LATERALITY (HCC): ICD-10-CM

## 2019-10-15 ENCOUNTER — ANTICOAGULATION MONITORING (OUTPATIENT)
Dept: VASCULAR LAB | Facility: MEDICAL CENTER | Age: 64
End: 2019-10-15

## 2019-10-15 DIAGNOSIS — Z79.01 CHRONIC ANTICOAGULATION: ICD-10-CM

## 2019-10-18 ENCOUNTER — TELEPHONE (OUTPATIENT)
Dept: VASCULAR LAB | Facility: MEDICAL CENTER | Age: 64
End: 2019-10-18

## 2019-10-18 NOTE — TELEPHONE ENCOUNTER
Pt called requesting for standing INR order to be faxed to lab at [facility].   Faxed order to [phone].    Corazon MartínezD

## 2019-10-30 LAB — INR PPP: 1.8 (ref 2–3.5)

## 2019-10-31 ENCOUNTER — ANTICOAGULATION MONITORING (OUTPATIENT)
Dept: VASCULAR LAB | Facility: MEDICAL CENTER | Age: 64
End: 2019-10-31

## 2019-10-31 DIAGNOSIS — Z79.01 CHRONIC ANTICOAGULATION: ICD-10-CM

## 2019-10-31 NOTE — PROGRESS NOTES
Anticoagulation Summary  As of 10/31/2019    INR goal:   2.0-3.0   TTR:   43.9 % (1.1 y)   INR used for dosin.80! (10/30/2019)   Warfarin maintenance plan:   10 mg (5 mg x 2) every Mon, Wed, Fri; 8 mg (1 mg x 3 and 5 mg x 1) all other days   Weekly warfarin total:   62 mg   Plan last modified:   Terrance Miller, Pharmacy Intern (3/12/2019)   Next INR check:   2019   Priority:   Acute   Target end date:   Indefinite    Indications    DVT (deep venous thrombosis) ([facility]) [I82.409]  Chronic anticoagulation [Z79.01]             Anticoagulation Episode Summary     INR check location:       Preferred lab:       Send INR reminders to:       Comments:         Anticoagulation Care Providers     Provider Role Specialty Phone number    Florentino Saunders M.D. Referring Cardiology [phone]    [facility] Anticoagulation Services Responsible  [phone]        Anticoagulation Patient Findings          Left voicemail message to report a SUB therapeutic INR of 1.8.  Pt to bolus 10 mg today then continue with current warfarin dosing regimen. Requested pt contact the clinic for any s/s of unusual bleeding, bruising, clotting or any changes to diet or medication. FU 2 weeks.    Sivakumar Johns, CorazonD

## 2019-11-12 DIAGNOSIS — Z79.01 CHRONIC ANTICOAGULATION: ICD-10-CM

## 2019-11-12 RX ORDER — WARFARIN SODIUM 5 MG/1
TABLET ORAL
Qty: 180 TAB | Refills: 1 | Status: SHIPPED | OUTPATIENT
Start: 2019-11-12 | End: 2020-08-12 | Stop reason: SDUPTHER

## 2019-11-12 RX ORDER — WARFARIN SODIUM 1 MG/1
TABLET ORAL
Qty: 270 TAB | Refills: 1 | Status: SHIPPED | OUTPATIENT
Start: 2019-11-12 | End: 2020-08-12 | Stop reason: SDUPTHER

## 2019-11-16 LAB — INR PPP: 2.4 (ref 2–3.5)

## 2019-11-18 ENCOUNTER — ANTICOAGULATION MONITORING (OUTPATIENT)
Dept: VASCULAR LAB | Facility: MEDICAL CENTER | Age: 64
End: 2019-11-18

## 2019-11-18 DIAGNOSIS — Z79.01 CHRONIC ANTICOAGULATION: ICD-10-CM

## 2019-11-18 NOTE — PROGRESS NOTES
Anticoagulation Summary  As of 2019    INR goal:   2.0-3.0   TTR:   44.8 % (1.1 y)   INR used for dosin.40 (2019)   Warfarin maintenance plan:   10 mg (5 mg x 2) every Mon, Wed, Fri; 8 mg (1 mg x 3 and 5 mg x 1) all other days   Weekly warfarin total:   62 mg   Plan last modified:   Terrance Miller, Pharmacy Intern (3/12/2019)   Next INR check:   2019   Priority:   Acute   Target end date:   Indefinite    Indications    DVT (deep venous thrombosis) ([facility]) [I82.409]  Chronic anticoagulation [Z79.01]             Anticoagulation Episode Summary     INR check location:       Preferred lab:       Send INR reminders to:       Comments:         Anticoagulation Care Providers     Provider Role Specialty Phone number    Florentino Saunders M.D. Referring Cardiology [phone]    [facility] Anticoagulation Services Responsible  [phone]        Anticoagulation Patient Findings    Left voicemail message to report a therapeutic INR of 2.4.  Pt to continue with current warfarin dosing regimen. Requested pt contact the clinic for any s/s of unusual bleeding, bruising, clotting or any changes to diet or medication. FU 4 weeks.  Rommel Randall, PharmD, BCACP

## 2020-01-07 ENCOUNTER — TELEPHONE (OUTPATIENT)
Dept: VASCULAR LAB | Facility: MEDICAL CENTER | Age: 65
End: 2020-01-07

## 2020-01-09 LAB — INR PPP: 2.4 (ref 2–3.5)

## 2020-01-10 ENCOUNTER — ANTICOAGULATION MONITORING (OUTPATIENT)
Dept: VASCULAR LAB | Facility: MEDICAL CENTER | Age: 65
End: 2020-01-10

## 2020-01-10 DIAGNOSIS — Z79.01 CHRONIC ANTICOAGULATION: ICD-10-CM

## 2020-01-10 NOTE — PROGRESS NOTES
Anticoagulation Summary  As of 1/10/2020    INR goal:   2.0-3.0   TTR:   51.3 % (1.3 y)   INR used for dosin.40 (2020)   Warfarin maintenance plan:   10 mg (5 mg x 2) every Mon, Wed, Fri; 8 mg (1 mg x 3 and 5 mg x 1) all other days   Weekly warfarin total:   62 mg   Plan last modified:   Terrance Miller, Pharmacy Intern (3/12/2019)   Next INR check:   2020   Priority:   Acute   Target end date:   Indefinite    Indications    DVT (deep venous thrombosis) ([facility]) [I82.409]  Chronic anticoagulation [Z79.01]             Anticoagulation Episode Summary     INR check location:       Preferred lab:       Send INR reminders to:       Comments:         Anticoagulation Care Providers     Provider Role Specialty Phone number    Florentino Saunders M.D. Referring Cardiology [phone]    [facility] Anticoagulation [facility] Responsible  [phone]        Anticoagulation Patient Findings    HPI:  Leta Alegria, on anticoagulation therapy with warfarin for DVT.   Changes to current medical/health status since last appt: none  Reports more bruising than usual, she will continue to monitor and reach out to our clinic if it worsens.    Denies any interval changes to diet  Denies any interval changes to medications since last appt.   Denies any complications or cost restrictions with current therapy.     A/P   INR  therapeutic.   Pt is to continue with current warfarin dosing regimen.     Next INR in 3 week(s).    Sivakumar Johns, PharmD

## 2020-01-31 LAB — INR PPP: 2 (ref 2–3.5)

## 2020-02-03 ENCOUNTER — ANTICOAGULATION MONITORING (OUTPATIENT)
Dept: VASCULAR LAB | Facility: MEDICAL CENTER | Age: 65
End: 2020-02-03

## 2020-02-03 DIAGNOSIS — I82.4Y9 DEEP VEIN THROMBOSIS (DVT) OF PROXIMAL LOWER EXTREMITY, UNSPECIFIED CHRONICITY, UNSPECIFIED LATERALITY (HCC): ICD-10-CM

## 2020-02-03 DIAGNOSIS — Z79.01 CHRONIC ANTICOAGULATION: ICD-10-CM

## 2020-02-21 LAB — INR PPP: 3 (ref 2–3.5)

## 2020-02-24 ENCOUNTER — ANTICOAGULATION MONITORING (OUTPATIENT)
Dept: VASCULAR LAB | Facility: MEDICAL CENTER | Age: 65
End: 2020-02-24

## 2020-02-24 DIAGNOSIS — I82.4Y9 DEEP VEIN THROMBOSIS (DVT) OF PROXIMAL LOWER EXTREMITY, UNSPECIFIED CHRONICITY, UNSPECIFIED LATERALITY (HCC): ICD-10-CM

## 2020-02-24 DIAGNOSIS — Z79.01 CHRONIC ANTICOAGULATION: ICD-10-CM

## 2020-02-24 NOTE — PROGRESS NOTES
OP Telephone Anticoagulation Service Note    Date: 2/24/2020      Anticoagulation Summary  As of 2/24/2020    INR goal:   2.0-3.0   TTR:   55.5 % (1.4 y)   INR used for dosing:   3.00 (2/21/2020)   Warfarin maintenance plan:   10 mg (5 mg x 2) every Mon, Wed, Fri; 8 mg (1 mg x 3 and 5 mg x 1) all other days   Weekly warfarin total:   62 mg   Plan last modified:   Terrance Miller, Pharmacy Intern (3/12/2019)   Next INR check:   3/23/2020   Priority:   Acute   Target end date:   Indefinite    Indications    DVT (deep venous thrombosis) ([facility]) [I82.409]  Chronic anticoagulation [Z79.01]             Anticoagulation Episode Summary     INR check location:       Preferred lab:       Send INR reminders to:       Comments:         Anticoagulation Care Providers     Provider Role Specialty Phone number    Florentino Saunders M.D. Referring Cardiology [phone]    [facility] Anticoagulation Services Responsible  [phone]        Anticoagulation Patient Findings        Plan: INR is in range. Left message on patient's answering machine/voicemail. Instructed patient to call back with any concerns regarding any unusual bleeding or bruising, any medication or diet changes or any signs or symptoms of thrombosis. Instructed patient to resume medication as outlined above. Patient to follow up in 4 week(s).       Bella Murillo, CorazonD

## 2020-04-17 ENCOUNTER — ANTICOAGULATION MONITORING (OUTPATIENT)
Dept: VASCULAR LAB | Facility: MEDICAL CENTER | Age: 65
End: 2020-04-17

## 2020-04-17 DIAGNOSIS — Z79.01 CHRONIC ANTICOAGULATION: ICD-10-CM

## 2020-04-17 DIAGNOSIS — I82.4Y9 DEEP VEIN THROMBOSIS (DVT) OF PROXIMAL LOWER EXTREMITY, UNSPECIFIED CHRONICITY, UNSPECIFIED LATERALITY (HCC): ICD-10-CM

## 2020-04-17 LAB — INR PPP: 1.9 (ref 2–3.5)

## 2020-04-17 NOTE — PROGRESS NOTES
Anticoagulation Summary  As of 2020    INR goal:   2.0-3.0   TTR:   59.1 % (1.5 y)   INR used for dosin.90! (2020)   Warfarin maintenance plan:   10 mg (5 mg x 2) every Mon, Wed, Fri; 8 mg (1 mg x 3 and 5 mg x 1) all other days   Weekly warfarin total:   62 mg   Plan last modified:   Terrance Miller, Pharmacy Intern (3/12/2019)   Next INR check:   2020   Priority:   Acute   Target end date:   Indefinite    Indications    DVT (deep venous thrombosis) ([facility]) [I82.409]  Chronic anticoagulation [Z79.01]             Anticoagulation Episode Summary     INR check location:       Preferred lab:       Send INR reminders to:       Comments:         Anticoagulation Care Providers     Provider Role Specialty Phone number    Florentino Saunders M.D. Referring Cardiology [phone]    [facility] Responsible  [phone]        Anticoagulation Patient Findings  Patient Findings     Positives:   Change in diet/appetite    Negatives:   Signs/symptoms of thrombosis, Signs/symptoms of bleeding, Laboratory test error suspected, Change in health, Change in alcohol use, Change in activity, Upcoming invasive procedure, Emergency department visit, Upcoming dental procedure, Missed doses, Extra doses, Change in medications, Hospital admission, Bruising, Other complaints    Comments:   More spinach than normal this past week        Spoke with patient today regarding subtherapeutic INR of 1.9.  Patient denies any signs/symptoms of bruising or bleeding or any changes in diet and medications.  Instructed patient to call clinic with any questions or concerns.  Pt is to continue with current warfarin dosing regimen as INR within 0.1 of goal.  She will also decrease spinach intake.  Follow up in 2 weeks, to reduce risk of adverse events related to this high risk medication,  Warfarin.    Rommel Randall, PharmD, BCACP

## 2020-05-14 LAB — INR PPP: 3.3 (ref 2–3.5)

## 2020-05-15 ENCOUNTER — ANTICOAGULATION MONITORING (OUTPATIENT)
Dept: MEDICAL GROUP | Facility: PHYSICIAN GROUP | Age: 65
End: 2020-05-15

## 2020-05-15 DIAGNOSIS — I82.4Y9 DEEP VEIN THROMBOSIS (DVT) OF PROXIMAL LOWER EXTREMITY, UNSPECIFIED CHRONICITY, UNSPECIFIED LATERALITY (HCC): ICD-10-CM

## 2020-05-15 DIAGNOSIS — Z79.01 CHRONIC ANTICOAGULATION: ICD-10-CM

## 2020-05-15 NOTE — PROGRESS NOTES
Anticoagulation Summary  As of 5/15/2020    INR goal:   2.0-3.0   TTR:   59.6 % (1.6 y)   INR used for dosing:   3.30! (5/14/2020)   Warfarin maintenance plan:   10 mg (5 mg x 2) every Mon, Wed, Fri; 8 mg (1 mg x 3 and 5 mg x 1) all other days   Weekly warfarin total:   62 mg   Plan last modified:   Terrance Miller, Pharmacy Intern (3/12/2019)   Next INR check:   5/21/2020   Priority:   Acute   Target end date:   Indefinite    Indications    DVT (deep venous thrombosis) ([facility]) [I82.409]  Chronic anticoagulation [Z79.01]             Anticoagulation Episode Summary     INR check location:       Preferred lab:       Send INR reminders to:       Comments:         Anticoagulation Care Providers     Provider Role Specialty Phone number    Florentino Saunders M.D. Referring Cardiology [phone]    [facility] Anticoagulation Services Responsible  [phone]        Anticoagulation Patient Findings      Spoke with patient.  INR is SUPRA therapeutic.   Pt states that she normally eats greens on her 10mg warfarin dosage days but hasn't been.  Pt denies any unusual s/s of bleeding, bruising, clotting or any changes to diet or medications. Denies any etoh, cranberries, supplements, or illness.   Pt verifies warfarin weekly dosing.     Will have pt take a reduced warfarin dose of 5mg x1 today and then resume her normal warfarin dosing, as she's going to get back on her schedule for her green intake.    Repeat INR in 1 week(s).     Leonarda Curry, PharmD

## 2020-05-22 ENCOUNTER — ANTICOAGULATION MONITORING (OUTPATIENT)
Dept: VASCULAR LAB | Facility: MEDICAL CENTER | Age: 65
End: 2020-05-22

## 2020-05-22 DIAGNOSIS — I82.4Y9 DEEP VEIN THROMBOSIS (DVT) OF PROXIMAL LOWER EXTREMITY, UNSPECIFIED CHRONICITY, UNSPECIFIED LATERALITY (HCC): ICD-10-CM

## 2020-05-22 DIAGNOSIS — Z79.01 CHRONIC ANTICOAGULATION: ICD-10-CM

## 2020-05-22 LAB — INR PPP: 2.4 (ref 2–3.5)

## 2020-05-22 NOTE — PROGRESS NOTES
Anticoagulation Summary  As of 2020    INR goal:   2.0-3.0   TTR:   59.7 % (1.6 y)   INR used for dosin.40 (2020)   Warfarin maintenance plan:   10 mg (5 mg x 2) every Mon, Wed, Fri; 8 mg (1 mg x 3 and 5 mg x 1) all other days   Weekly warfarin total:   62 mg   No change documented:   Karime Angel   Plan last modified:   Terrance Miller, Pharmacy Intern (3/12/2019)   Next INR check:   2020   Priority:   Acute   Target end date:   Indefinite    Indications    DVT (deep venous thrombosis) ([facility]) [I82.409]  Chronic anticoagulation [Z79.01]             Anticoagulation Episode Summary     INR check location:       Preferred lab:       Send INR reminders to:       Comments:         Anticoagulation Care Providers     Provider Role Specialty Phone number    Florentino Saunders M.D. Referring Cardiology [phone]    [facility] Anticoagulation Services Responsible  [phone]        Anticoagulation Patient Findings     Left a message on the patient's voicemail today, informing the patient of a therapeutic INR of 2.4. Instructed the patient to call the clinic with any changes to diet or medications, with any signs/sx of bleeding or clotting or with any questions or concerns.     Patient instructed to continue with the current warfarin dosing regimen, and asked to follow up again in 2 weeks.     Jenise ChandraD

## 2020-06-23 ENCOUNTER — ANTICOAGULATION MONITORING (OUTPATIENT)
Dept: VASCULAR LAB | Facility: MEDICAL CENTER | Age: 65
End: 2020-06-23

## 2020-06-23 DIAGNOSIS — I82.4Y9 DEEP VEIN THROMBOSIS (DVT) OF PROXIMAL LOWER EXTREMITY, UNSPECIFIED CHRONICITY, UNSPECIFIED LATERALITY (HCC): ICD-10-CM

## 2020-06-23 DIAGNOSIS — Z79.01 CHRONIC ANTICOAGULATION: ICD-10-CM

## 2020-06-23 LAB — INR PPP: 2.1 (ref 2–3.5)

## 2020-06-23 NOTE — PROGRESS NOTES
OP   Telephone Anticoagulation Service Note      Anticoagulation Summary  As of 2020    INR goal:   2.0-3.0   TTR:   61.4 % (1.7 y)   INR used for dosin.10 (2020)   Warfarin maintenance plan:   10 mg (5 mg x 2) every Mon, Wed, Fri; 8 mg (1 mg x 3 and 5 mg x 1) all other days   Weekly warfarin total:   62 mg   Plan last modified:   Terrance Miller, Pharmacy Intern (3/12/2019)   Next INR check:   2020   Priority:   Acute   Target end date:   Indefinite    Indications    DVT (deep venous thrombosis) ([facility]) [I82.409]  Chronic anticoagulation [Z79.01]             Anticoagulation Episode Summary     INR check location:       Preferred lab:       Send INR reminders to:       Comments:         Anticoagulation Care Providers     Provider Role Specialty Phone number    Florentino Saunders M.D. Referring Cardiology [phone]    [facility] Anticoagulation Services Responsible  [phone]        Anticoagulation Patient Findings    Left a message on the patient's voicemail today, informing the patient of a therapeutic INR of 2.1. Instructed the patient to call the clinic with any changes to diet or medications, with any signs/sx of bleeding or clotting or with any questions or concerns.     Patient instructed to continue with the current warfarin dosing regimen, and asked to follow up again in 4 weeks.     Jenise ChandraD

## 2020-07-24 LAB — INR PPP: 3.2 (ref 2–3.5)

## 2020-07-27 ENCOUNTER — ANTICOAGULATION MONITORING (OUTPATIENT)
Dept: VASCULAR LAB | Facility: MEDICAL CENTER | Age: 65
End: 2020-07-27

## 2020-07-27 DIAGNOSIS — Z79.01 CHRONIC ANTICOAGULATION: ICD-10-CM

## 2020-07-27 DIAGNOSIS — I82.4Y9 DEEP VEIN THROMBOSIS (DVT) OF PROXIMAL LOWER EXTREMITY, UNSPECIFIED CHRONICITY, UNSPECIFIED LATERALITY (HCC): ICD-10-CM

## 2020-07-27 NOTE — PROGRESS NOTES
OP Telephone Anticoagulation Service Note    Date: 7/27/2020      Anticoagulation Summary  As of 7/27/2020    INR goal:   2.0-3.0   TTR:   62.6 % (1.8 y)   INR used for dosing:   3.20! (7/24/2020)   Warfarin maintenance plan:   10 mg (5 mg x 2) every Mon, Wed, Fri; 8 mg (1 mg x 3 and 5 mg x 1) all other days   Weekly warfarin total:   62 mg   Plan last modified:   eTrrance Miller, Pharmacy Intern (3/12/2019)   Next INR check:   8/7/2020   Priority:   Acute   Target end date:   Indefinite    Indications    DVT (deep venous thrombosis) ([facility]) [I82.409]  Chronic anticoagulation [Z79.01]             Anticoagulation Episode Summary     INR check location:       Preferred lab:       Send INR reminders to:       Comments:         Anticoagulation Care Providers     Provider Role Specialty Phone number    Florentino Saunders M.D. Referring Cardiology [phone]    [facility] Anticoagulation Services Responsible  [phone]        Anticoagulation Patient Findings        Plan: Spoke with patient on the phone. Patient is supra therapeutic today. Confirmed dosing. No missed tablets in the last week. Patient reports eating less green vegetables. Patient denies any signs or symptoms of bleeding or clotting. Instructed patient to call clinic if any unusual bleeding or bruising occurs. Will take 5 mg tonight then continue dosing as outlined. Will follow-up with patient in 2 week(s).              Bella Murillo, PharmD

## 2020-08-12 DIAGNOSIS — Z79.01 CHRONIC ANTICOAGULATION: ICD-10-CM

## 2020-08-12 RX ORDER — WARFARIN SODIUM 1 MG/1
TABLET ORAL
Qty: 270 TAB | Refills: 1 | Status: SHIPPED | OUTPATIENT
Start: 2020-08-12 | End: 2020-08-13 | Stop reason: SDUPTHER

## 2020-08-12 RX ORDER — WARFARIN SODIUM 5 MG/1
TABLET ORAL
Qty: 180 TAB | Refills: 1 | Status: SHIPPED | OUTPATIENT
Start: 2020-08-12 | End: 2020-08-13 | Stop reason: SDUPTHER

## 2020-08-13 DIAGNOSIS — Z79.01 CHRONIC ANTICOAGULATION: ICD-10-CM

## 2020-08-13 RX ORDER — WARFARIN SODIUM 1 MG/1
TABLET ORAL
Qty: 270 TAB | Refills: 1 | Status: SHIPPED | OUTPATIENT
Start: 2020-08-13 | End: 2020-08-27 | Stop reason: SDUPTHER

## 2020-08-13 RX ORDER — WARFARIN SODIUM 5 MG/1
TABLET ORAL
Qty: 180 TAB | Refills: 1 | Status: SHIPPED | OUTPATIENT
Start: 2020-08-13 | End: 2020-08-27 | Stop reason: SDUPTHER

## 2020-08-18 NOTE — PROGRESS NOTES
[facility]    Pt's pharmacy from [place] called stating they cannot fill Rx for Coumadin because Coumadin Brand has been discontinued.  Spoke with pt to give her these updates.  She is open to using [facility] but would like to find a new pharmacy that will take her worker's compensation.  She will find a pharmacy that takes her plan and will call our clinic to determine who to send her new Rx.    Sivakumar Johns, PharmD

## 2020-08-25 ENCOUNTER — TELEPHONE (OUTPATIENT)
Dept: VASCULAR LAB | Facility: MEDICAL CENTER | Age: 65
End: 2020-08-25

## 2020-08-25 NOTE — TELEPHONE ENCOUNTER
[facility]    Pt is over due for PT/INR for warfarin monitoring. Left message for patient to have INR checked. Clinic phone number left for any questions or concerns.    Jenise Angel  PharmD

## 2020-08-26 LAB — INR PPP: 2.4 (ref 2–3.5)

## 2020-08-27 ENCOUNTER — ANTICOAGULATION MONITORING (OUTPATIENT)
Dept: VASCULAR LAB | Facility: MEDICAL CENTER | Age: 65
End: 2020-08-27

## 2020-08-27 DIAGNOSIS — I82.4Y9 DEEP VEIN THROMBOSIS (DVT) OF PROXIMAL LOWER EXTREMITY, UNSPECIFIED CHRONICITY, UNSPECIFIED LATERALITY (HCC): ICD-10-CM

## 2020-08-27 DIAGNOSIS — Z79.01 CHRONIC ANTICOAGULATION: ICD-10-CM

## 2020-08-27 RX ORDER — WARFARIN SODIUM 1 MG/1
TABLET ORAL
Qty: 270 TAB | Refills: 1 | Status: SHIPPED | OUTPATIENT
Start: 2020-08-27 | End: 2020-12-04

## 2020-08-27 RX ORDER — WARFARIN SODIUM 5 MG/1
TABLET ORAL
Qty: 180 TAB | Refills: 1 | Status: SHIPPED | OUTPATIENT
Start: 2020-08-27 | End: 2020-12-31

## 2020-08-27 NOTE — PROGRESS NOTES
Anticoagulation Summary  As of 2020    INR goal:  2.0-3.0   TTR:  63.2 % (1.9 y)   INR used for dosin.40 (2020)   Warfarin maintenance plan:  10 mg (5 mg x 2) every Mon, Wed, Fri; 8 mg (1 mg x 3 and 5 mg x 1) all other days   Weekly warfarin total:  62 mg   Plan last modified:  Terrance Miller, Pharmacy Intern (3/12/2019)   Next INR check:  2020   Priority:  Acute   Target end date:  Indefinite    Indications    DVT (deep venous thrombosis) (HCC) [I82.409]  Chronic anticoagulation [Z79.01]             Anticoagulation Episode Summary     INR check location:      Preferred lab:      Send INR reminders to:      Comments:        Anticoagulation Care Providers     Provider Role Specialty Phone number    Florentino Saunders M.D. Referring Cardiology [phone]    [facility] Responsible  [phone]        Anticoagulation Patient Findings          Spoke with Leta to report therapeutic INR of 2.4.  PT continues to talk over me and shouts.  Pt is upset that branded COUMADIN is not produced any longer.  Offered doacs, xarelto or eliquis.  Pt becomes abusive at this point.  PT SCREAMING about a letter to workmans compensation for drug change, however will not let me ask if she would prefer the Jantoven, or warfarin as she requested that refill at [facility] in [place] as offered by ISMAEL Johns.    PT IS IRATE THAT SHE HAS MULTIPLE PHARMACIES ON FILE.  Removed all except [facility] which is now her preferred    Pt abruptly hung up phone.  Marissa Nayak, Clinical Pharmacist, CDE, CACP

## 2020-09-30 LAB — INR PPP: 2.6 (ref 2–3.5)

## 2020-10-01 ENCOUNTER — ANTICOAGULATION MONITORING (OUTPATIENT)
Dept: VASCULAR LAB | Facility: MEDICAL CENTER | Age: 65
End: 2020-10-01

## 2020-10-01 DIAGNOSIS — I82.4Y9 DEEP VEIN THROMBOSIS (DVT) OF PROXIMAL LOWER EXTREMITY, UNSPECIFIED CHRONICITY, UNSPECIFIED LATERALITY (HCC): ICD-10-CM

## 2020-10-01 DIAGNOSIS — Z79.01 CHRONIC ANTICOAGULATION: ICD-10-CM

## 2020-10-02 NOTE — PROGRESS NOTES
Anticoagulation Summary  As of 10/1/2020    INR goal:  2.0-3.0   TTR:  65.0 % (2 y)   INR used for dosin.60 (2020)   Warfarin maintenance plan:  10 mg (5 mg x 2) every Mon, Wed, Fri; 8 mg (1 mg x 3 and 5 mg x 1) all other days   Weekly warfarin total:  62 mg   Plan last modified:  Terrance Miller, Pharmacy Intern (3/12/2019)   Next INR check:  2020   Priority:  Acute   Target end date:  Indefinite    Indications    DVT (deep venous thrombosis) ([facility]) [I82.409]  Chronic anticoagulation [Z79.01]             Anticoagulation Episode Summary     INR check location:      Preferred lab:      Send INR reminders to:      Comments:        Anticoagulation Care Providers     Provider Role Specialty Phone number    Florentino Saunders M.D. Referring Cardiology [phone]    [facility] Anticoagulation Services Responsible  [phone]        Anticoagulation Patient Findings    Spoke with patient today regarding therapeutic INR of 2.6. Patient was in a rush to get off the phone and I did not have the opportunity to ask about s/sx bleeding/bruising or any changes to her diet or medications. Patient would like to follow up in 2-3 weeks for her next INR.     Pt is to continue with current warfarin dosing regimen.    Follow up in 2-3 weeks per patient request, to reduce risk of adverse events related to this high risk medication,  Warfarin.    Ebony Del Rosario, Pharmacy Intern

## 2020-10-29 ENCOUNTER — ANTICOAGULATION MONITORING (OUTPATIENT)
Dept: VASCULAR LAB | Facility: MEDICAL CENTER | Age: 65
End: 2020-10-29

## 2020-10-29 DIAGNOSIS — Z79.01 CHRONIC ANTICOAGULATION: ICD-10-CM

## 2020-10-29 LAB — INR PPP: 2.2 (ref 2–3.5)

## 2020-10-30 NOTE — PROGRESS NOTES
Anticoagulation Summary  As of 10/29/2020    INR goal:  2.0-3.0   TTR:  66.3 % (2.1 y)   INR used for dosin.20 (10/29/2020)   Warfarin maintenance plan:  10 mg (5 mg x 2) every Mon, Wed, Fri; 8 mg (1 mg x 3 and 5 mg x 1) all other days   Weekly warfarin total:  62 mg   Plan last modified:  Terrance Miller, Pharmacy Intern (3/12/2019)   Next INR check:  12/10/2020   Priority:  Acute   Target end date:  Indefinite    Indications    DVT (deep venous thrombosis) (HCC) [I82.409]  Chronic anticoagulation [Z79.01]             Anticoagulation Episode Summary     INR check location:      Preferred lab:      Send INR reminders to:      Comments:        Anticoagulation Care Providers     Provider Role Specialty Phone number    Florentino Saunders M.D. Referring Cardiology [phone]    [facility] Anticoagulation Services Responsible  [phone]        Anticoagulation Patient Findings      Left voicemail message to report a therapeutic INR of 2.2.      Pt to continue with current warfarin dosing regimen. Requested pt contact the clinic for any s/s of unusual bleeding, bruising, clotting or any changes to diet or medication.    FU INR in 6 week(s).    Cony Watson, PharmD

## 2020-11-05 ENCOUNTER — TELEPHONE (OUTPATIENT)
Dept: VASCULAR LAB | Facility: MEDICAL CENTER | Age: 65
End: 2020-11-05

## 2020-11-05 NOTE — TELEPHONE ENCOUNTER
----- Message from Marissa Nayak sent at 11/5/2020  7:19 AM PST -----  Regarding: please call  Pt has medication questions

## 2020-11-05 NOTE — TELEPHONE ENCOUNTER
Received call from pt that her pain doctor wants to place her on a chronic oral NSAID because nothing else is working. Her pain doctor wanted her to check with our clinic first.     Explained to pt that celecoxib may be a choice since it selectively inhibits LAWLER-2, reducing its risk for GIB. However the risk still exists.    Pt requesting me to send information on celecoxib. Will mail a generic patient drug info document from [employer].    Cony Watson PharmD

## 2020-12-04 DIAGNOSIS — Z79.01 CHRONIC ANTICOAGULATION: ICD-10-CM

## 2020-12-04 RX ORDER — WARFARIN SODIUM 1 MG/1
TABLET ORAL
Qty: 270 TAB | Refills: 0 | Status: SHIPPED | OUTPATIENT
Start: 2020-12-04 | End: 2020-12-31

## 2020-12-29 LAB — INR PPP: 1.5 (ref 2–3.5)

## 2020-12-30 ENCOUNTER — ANTICOAGULATION MONITORING (OUTPATIENT)
Dept: VASCULAR LAB | Facility: MEDICAL CENTER | Age: 65
End: 2020-12-30

## 2020-12-30 DIAGNOSIS — Z79.01 CHRONIC ANTICOAGULATION: ICD-10-CM

## 2020-12-31 DIAGNOSIS — Z79.01 CHRONIC ANTICOAGULATION: ICD-10-CM

## 2020-12-31 RX ORDER — WARFARIN SODIUM 5 MG/1
TABLET ORAL
Qty: 180 TAB | Refills: 1 | Status: SHIPPED | OUTPATIENT
Start: 2020-12-31 | End: 2021-09-10 | Stop reason: SDUPTHER

## 2020-12-31 RX ORDER — WARFARIN SODIUM 1 MG/1
TABLET ORAL
Qty: 180 TAB | Refills: 1 | Status: SHIPPED | OUTPATIENT
Start: 2020-12-31 | End: 2023-09-15 | Stop reason: SDUPTHER

## 2020-12-31 NOTE — PROGRESS NOTES
Anticoagulation Summary  As of 2020    INR goal:  2.0-3.0   TTR:  63.5 % (2.2 y)   INR used for dosin.50 (2020)   Warfarin maintenance plan:  10 mg (5 mg x 2) every Mon, Wed, Fri; 8 mg (1 mg x 3 and 5 mg x 1) all other days   Weekly warfarin total:  62 mg   Plan last modified:  Terrance Miller, Pharmacy Intern (3/12/2019)   Next INR check:  2021   Priority:  Acute   Target end date:  Indefinite    Indications    DVT (deep venous thrombosis) ([facility]) [I82.409]  Chronic anticoagulation [Z79.01]             Anticoagulation Episode Summary     INR check location:      Preferred lab:      Send INR reminders to:      Comments:        Anticoagulation Care Providers     Provider Role Specialty Phone number    Florentino Saunders M.D. Referring Cardiology [phone]    [facility] Anticoagulation Services Responsible  [phone]        Anticoagulation Patient Findings  Patient Findings     Positives:  Change in diet/appetite (large amount of brussel sprouts this past week)    Negatives:  Signs/symptoms of thrombosis, Signs/symptoms of bleeding, Laboratory test error suspected, Change in health, Change in alcohol use, Change in activity, Upcoming invasive procedure, Emergency department visit, Upcoming dental procedure, Missed doses, Extra doses, Change in medications, Hospital admission, Bruising, Other complaints        Spoke with patient today regarding subtherapeutic INR of 1.5.  Patient denies any signs/symptoms of bruising or bleeding or any changes in diet and medications.  Instructed patient to call clinic with any questions or concerns  Temporary increase in greens this past week.  Instructed patient to bolus with 13mg X 1, then resume current warfarin regimen.  Follow up in 1 weeks, to reduce risk of adverse events related to this high risk medication,  Warfarin.    Rommel Randall, PharmD, BCACP

## 2021-02-09 ENCOUNTER — TELEPHONE (OUTPATIENT)
Dept: VASCULAR LAB | Facility: MEDICAL CENTER | Age: 66
End: 2021-02-09

## 2021-02-12 ENCOUNTER — ANTICOAGULATION MONITORING (OUTPATIENT)
Dept: VASCULAR LAB | Facility: MEDICAL CENTER | Age: 66
End: 2021-02-12

## 2021-02-12 DIAGNOSIS — Z79.01 CHRONIC ANTICOAGULATION: ICD-10-CM

## 2021-02-12 LAB — INR PPP: 3 (ref 2–3.5)

## 2021-02-14 NOTE — PROGRESS NOTES
OP Telephone Anticoagulation Service Note    Date: 2/14/2021      Anticoagulation Summary  As of 2/12/2021    INR goal:  2.0-3.0   TTR:  63.7 % (2.4 y)   INR used for dosing:  3.00 (2/12/2021)   Warfarin maintenance plan:  10 mg (5 mg x 2) every Mon, Wed, Fri; 8 mg (1 mg x 3 and 5 mg x 1) all other days   Weekly warfarin total:  62 mg   Plan last modified:  Terrance Miller, Pharmacy Intern (3/12/2019)   Next INR check:  2/26/2021   Priority:  Acute   Target end date:  Indefinite    Indications    DVT (deep venous thrombosis) (HCC) [I82.409]  Chronic anticoagulation [Z79.01]             Anticoagulation Episode Summary     INR check location:      Preferred lab:      Send INR reminders to:      Comments:        Anticoagulation Care Providers     Provider Role Specialty Phone number    Florentino Saunders M.D. Referring Cardiology [phone]    [facility] Anticoagulation Services Responsible  [phone]        Anticoagulation Patient Findings      INR therapeutic at 3.  Left voicemail message for pt.  Verified regimen.  Instructed pt to continue on with current regimen.  Told pt to call if any s/s of bleeding or medication changes.  Check INR in 2 week(s).  Instructed pt to call clinic at [phone] if there are any questions.    Kodi Allen, David

## 2021-03-05 ENCOUNTER — TELEPHONE (OUTPATIENT)
Dept: VASCULAR LAB | Facility: MEDICAL CENTER | Age: 66
End: 2021-03-05

## 2021-03-05 NOTE — TELEPHONE ENCOUNTER
"Spoke with jose martin who reports that she is going to be scheduled for \"major dental surgery\"  Pt however, does not know what the dentist will do.  She is refusing to discuss lovenox bridging as she had \"horrible pancreatitis\" in 2002.  She is meeting with the dentist again 3- and should have more information at that time.  Marissa Nayak, Clinical Pharmacist, CDE, CACP    "

## 2021-03-18 ENCOUNTER — TELEPHONE (OUTPATIENT)
Dept: VASCULAR LAB | Facility: MEDICAL CENTER | Age: 66
End: 2021-03-18

## 2021-03-19 NOTE — TELEPHONE ENCOUNTER
[facility] [place] [facility]      Pt is over due for PT/INR for warfarin monitoring. Left message for patient to have INR checked ASAP.   Clinic phone number left for any questions or concerns.    Jenise Angel  PharmD

## 2021-03-30 ENCOUNTER — TELEPHONE (OUTPATIENT)
Dept: VASCULAR LAB | Facility: MEDICAL CENTER | Age: 66
End: 2021-03-30

## 2021-03-30 NOTE — TELEPHONE ENCOUNTER
Anticoagulation clinic    Reminder call for patient regarding getting INR done ASAP for anticoagulation clinic.     INR   Date Value Ref Range Status   02/12/2021 3.00 2 - 3.5 Final     No results found for: POCINR  INR goal: [unfilled]    Chele Matias, CorazonD

## 2021-04-01 ENCOUNTER — ANTICOAGULATION MONITORING (OUTPATIENT)
Dept: MEDICAL GROUP | Facility: MEDICAL CENTER | Age: 66
End: 2021-04-01

## 2021-04-01 DIAGNOSIS — Z79.01 CHRONIC ANTICOAGULATION: ICD-10-CM

## 2021-04-01 LAB — INR PPP: 2.4 (ref 2–3.5)

## 2021-04-01 NOTE — PROGRESS NOTES
Anticoagulation Summary  As of 2021    INR goal:  2.0-3.0   TTR:  65.6 % (2.5 y)   INR used for dosin.40 (2021)   Warfarin maintenance plan:  10 mg (5 mg x 2) every Mon, Wed, Fri; 8 mg (1 mg x 3 and 5 mg x 1) all other days   Weekly warfarin total:  62 mg   Plan last modified:  Marissa Nayak (2021)   Next INR check:  2021   Priority:  Acute   Target end date:  Indefinite    Indications    DVT (deep venous thrombosis) (HCC) [I82.409]  Chronic anticoagulation [Z79.01]             Anticoagulation Episode Summary     INR check location:      Preferred lab:      Send INR reminders to:      Comments:        Anticoagulation Care Providers     Provider Role Specialty Phone number    Florentino Saunders M.D. Referring Cardiology [phone]    [facility] Anticoagulation Services Responsible  [phone]        Anticoagulation Patient Findings          Spoke with Leta to report a therapeutic INR of 2.4. Continue current dosing regimen.  Follow up in 8 weeks, to reduce the risk of adverse events related to this high risk medication, warfarin.    Marissa Nayak, Clinical Pharmacist

## 2021-04-12 ENCOUNTER — ANTICOAGULATION MONITORING (OUTPATIENT)
Dept: VASCULAR LAB | Facility: MEDICAL CENTER | Age: 66
End: 2021-04-12

## 2021-04-12 DIAGNOSIS — Z79.01 CHRONIC ANTICOAGULATION: ICD-10-CM

## 2021-05-04 ENCOUNTER — ANTICOAGULATION MONITORING (OUTPATIENT)
Dept: VASCULAR LAB | Facility: MEDICAL CENTER | Age: 66
End: 2021-05-04

## 2021-05-04 DIAGNOSIS — Z79.01 CHRONIC ANTICOAGULATION: ICD-10-CM

## 2021-05-04 LAB — INR PPP: 1.8 (ref 2–3.5)

## 2021-05-04 NOTE — PROGRESS NOTES
Anticoagulation Summary  As of 2021    INR goal:  2.0-3.0   TTR:  65.6 % (2.6 y)   INR used for dosin.80 (2021)   Warfarin maintenance plan:  10 mg (5 mg x 2) every Mon, Wed, Fri; 8 mg (1 mg x 3 and 5 mg x 1) all other days   Weekly warfarin total:  62 mg   Plan last modified:  Marissa RAI Filter (2021)   Next INR check:  2021   Priority:  Acute   Target end date:  Indefinite    Indications    DVT (deep venous thrombosis) (HCC) [I82.409]  Chronic anticoagulation [Z79.01]             Anticoagulation Episode Summary     INR check location:      Preferred lab:      Send INR reminders to:      Comments:        Anticoagulation Care Providers     Provider Role Specialty Phone number    Florentino Saunders M.D. Referring Cardiology [phone]    [facility] Antico[facility] Responsible  [phone]        Anticoagulation Patient Findings    HPI:  Leta Alegria, on anticoagulation therapy with warfarin for DVT.   Changes to current medical/health status since last appt: none  Denies signs/symptoms of bleeding and/or thrombosis since the last appt.    Reports eating more greens than usual, but this was only a phase.   Denies any interval changes to medications since last appt.   Denies any complications or cost restrictions with current therapy.     A/P   INR  SUB-therapeutic.   Bolus today then Pt is to continue with current warfarin dosing regimen.     Next INR in 1 week(s).    Sivakumar Johns, CorazonD

## 2021-06-08 ENCOUNTER — ANTICOAGULATION MONITORING (OUTPATIENT)
Dept: VASCULAR LAB | Facility: MEDICAL CENTER | Age: 66
End: 2021-06-08

## 2021-06-08 DIAGNOSIS — Z79.01 CHRONIC ANTICOAGULATION: ICD-10-CM

## 2021-06-08 NOTE — PROGRESS NOTES
Anticoagulation clinic INR reminder.     Past due for INR and/or anticoagulation appointment.  Please get an INR as soon as possible.     INR   Date Value Ref Range Status   05/04/2021 1.80 (A) 2 - 3.5 Final     No results found for: POCINR    If you need to make an appointment for a anticoagulation appointment please call us.  [facility]  Phone: [phone], Fax: [phone]

## 2021-06-21 ENCOUNTER — TELEPHONE (OUTPATIENT)
Dept: VASCULAR LAB | Facility: MEDICAL CENTER | Age: 66
End: 2021-06-21

## 2021-06-22 ENCOUNTER — ANTICOAGULATION MONITORING (OUTPATIENT)
Dept: VASCULAR LAB | Facility: MEDICAL CENTER | Age: 66
End: 2021-06-22

## 2021-06-22 DIAGNOSIS — Z79.01 CHRONIC ANTICOAGULATION: ICD-10-CM

## 2021-06-22 LAB — INR PPP: 2.3 (ref 2–3.5)

## 2021-06-22 NOTE — PROGRESS NOTES
Anticoagulation Summary  As of 2021    INR goal:  2.0-3.0   TTR:  65.4 % (2.7 y)   INR used for dosin.30 (2021)   Warfarin maintenance plan:  10 mg (5 mg x 2) every Mon, Wed, Fri; 8 mg (1 mg x 3 and 5 mg x 1) all other days   Weekly warfarin total:  62 mg   Plan last modified:  Marissa M Filter (2021)   Next INR check:     Priority:  Acute   Target end date:  Indefinite    Indications    DVT (deep venous thrombosis) (HCC) [I82.409]  Chronic anticoagulation [Z79.01]             Anticoagulation Episode Summary     INR check location:      Preferred lab:      Send INR reminders to:      Comments:        Anticoagulation Care Providers     Provider Role Specialty Phone number    Florentino Saunders M.D. Referring Cardiology [phone]    [facility] Anticoagulation Services Responsible  [phone]        Anticoagulation Patient Findings      Left voicemail message to report a therapeutic INR of 2.3.     Pt to continue with current warfarin dosing regimen. Requested pt contact the clinic for any s/s of unusual bleeding, bruising, clotting or any changes to diet or medication.    FU INR in 2 week(s).    Chanelle Huitron, Pharmacy Intern

## 2021-07-23 ENCOUNTER — DOCUMENTATION (OUTPATIENT)
Dept: VASCULAR LAB | Facility: MEDICAL CENTER | Age: 66
End: 2021-07-23

## 2021-07-30 ENCOUNTER — DOCUMENTATION (OUTPATIENT)
Dept: VASCULAR LAB | Facility: MEDICAL CENTER | Age: 66
End: 2021-07-30

## 2021-07-30 NOTE — PROGRESS NOTES
[facility] & [place] [facility]    Pt called reporting she was contacted by health department for potential COVID exposure - will be getting screened today.    She is aware she is overdue for INR. Will contact in next few days regarding her status.      Ronnell Jesus, PharmD, [facility]ticoagulation[facility]Pharmacotherapy Clinic at [phone], fax [phone]

## 2021-08-20 LAB — INR PPP: 2.2 (ref 2–3.5)

## 2021-08-23 ENCOUNTER — ANTICOAGULATION MONITORING (OUTPATIENT)
Dept: VASCULAR LAB | Facility: MEDICAL CENTER | Age: 66
End: 2021-08-23

## 2021-08-23 DIAGNOSIS — Z79.01 CHRONIC ANTICOAGULATION: ICD-10-CM

## 2021-08-23 DIAGNOSIS — I82.4Y9 DEEP VEIN THROMBOSIS (DVT) OF PROXIMAL LOWER EXTREMITY, UNSPECIFIED CHRONICITY, UNSPECIFIED LATERALITY (HCC): ICD-10-CM

## 2021-08-23 NOTE — PROGRESS NOTES
Anticoagulation Summary  As of 2021    INR goal:  2.0-3.0   TTR:  67.3 % (2.9 y)   INR used for dosin.20 (2021)   Warfarin maintenance plan:  10 mg (5 mg x 2) every Mon, Wed, Fri; 8 mg (1 mg x 3 and 5 mg x 1) all other days   Weekly warfarin total:  62 mg   Plan last modified:  Marissa RAI Filter (2021)   Next INR check:  10/4/2021   Priority:  Acute   Target end date:  Indefinite    Indications    DVT (deep venous thrombosis) (HCC) [I82.409]  Chronic anticoagulation [Z79.01]             Anticoagulation Episode Summary     INR check location:      Preferred lab:      Send INR reminders to:      Comments:        Anticoagulation Care Providers     Provider Role Specialty Phone number    Florentino Saunders M.D. Referring Cardiology [phone]    [facility] Responsible  [phone]        Anticoagulation Patient Findings  Patient Findings     Negatives:  Signs/symptoms of thrombosis, Signs/symptoms of bleeding, Laboratory test error suspected, Change in health, Change in alcohol use, Change in activity, Upcoming invasive procedure, Emergency department visit, Upcoming dental procedure, Missed doses, Extra doses, Change in medications, Change in diet/appetite, Hospital admission, Bruising, Other complaints           Spoke with patient today regarding is therapeutic INR of 2.2.  Patient denies any signs/symptoms of bruising or bleeding or any changes in diet and medications.  Instructed patient to call clinic with any questions or concerns.    Pt is to continue with current warfarin dosing regimen.    Follow up in 4 weeks, to reduce risk of adverse events related to this high risk medication,  Warfarin.    Tolu Donnelly, Pharmacy Intern

## 2021-09-10 DIAGNOSIS — Z79.01 CHRONIC ANTICOAGULATION: ICD-10-CM

## 2021-09-10 RX ORDER — WARFARIN SODIUM 5 MG/1
TABLET ORAL
Qty: 180 TABLET | Refills: 1 | Status: SHIPPED | OUTPATIENT
Start: 2021-09-10 | End: 2022-02-22 | Stop reason: SDUPTHER

## 2021-10-15 ENCOUNTER — TELEPHONE (OUTPATIENT)
Dept: VASCULAR LAB | Facility: MEDICAL CENTER | Age: 66
End: 2021-10-15

## 2021-10-19 ENCOUNTER — TELEPHONE (OUTPATIENT)
Dept: VASCULAR LAB | Facility: MEDICAL CENTER | Age: 66
End: 2021-10-19

## 2021-10-19 NOTE — TELEPHONE ENCOUNTER
Anticoagulation clinic reminder.     Past due for anticoagulation appointment.     INR   Date Value Ref Range Status   08/20/2021 2.20 2 - 3.5 Final     No results found for: POCINR      Current Outpatient Medications:   •  warfarin, Take one to two tablets by mouth daily (in addition to 1mg tabs) or as directed by anticoagulation clinic  •  warfarin, Take three tablets by mouth daily (in addition to 5mg tabs) or as directed by anticoagulation clinic  •  amLODIPine, TAKE 1 TABLET BY MOUTH DAILY  BRING ALL MEDICATIONS TO EVERY APPOINTMENT  •  KETOPROFEN, 0.5 Inch, Does not apply, PRN  •  lisinopril, 20 mg, Oral, DAILY  •  lovastatin, 20 mg, Oral, Nightly  •  tizanidine, 2 mg, Oral, TID  •  amitriptyline, 25 mg, Oral, Nightly  •  Norco, 1-2 Tablet, Oral, Q6HRS PRN  •  Lidoderm, 1 Patch, Transdermal, Q24HRS    [facility] of [facility] V[facility]  Phone: [phone], Fax: [phone]

## 2021-10-26 ENCOUNTER — DOCUMENTATION (OUTPATIENT)
Dept: VASCULAR LAB | Facility: MEDICAL CENTER | Age: 66
End: 2021-10-26

## 2021-10-26 NOTE — PROGRESS NOTES
Anticoagulation clinic reminder.     Past due for anticoagulation appointment.     INR   Date Value Ref Range Status   08/20/2021 2.20 2 - 3.5 Final     No results found for: POCINR      Current Outpatient Medications:   •  warfarin, Take one to two tablets by mouth daily (in addition to 1mg tabs) or as directed by anticoagulation clinic  •  warfarin, Take three tablets by mouth daily (in addition to 5mg tabs) or as directed by anticoagulation clinic  •  amLODIPine, TAKE 1 TABLET BY MOUTH DAILY  BRING ALL MEDICATIONS TO EVERY APPOINTMENT  •  KETOPROFEN, 0.5 Inch, Does not apply, PRN  •  lisinopril, 20 mg, Oral, DAILY  •  lovastatin, 20 mg, Oral, Nightly  •  tizanidine, 2 mg, Oral, TID  •  amitriptyline, 25 mg, Oral, Nightly  •  Norco, 1-2 Tablet, Oral, Q6HRS PRN  •  Lidoderm, 1 Patch, Transdermal, Q24HRS    [facility] of [facility] and V[facility] Health  Phone: [phone], Fax: [phone]

## 2021-11-02 ENCOUNTER — DOCUMENTATION (OUTPATIENT)
Dept: VASCULAR LAB | Facility: MEDICAL CENTER | Age: 66
End: 2021-11-02

## 2021-11-02 NOTE — PROGRESS NOTES
Anticoagulation clinic reminder.     Past due for anticoagulation appointment.     INR   Date Value Ref Range Status   08/20/2021 2.20 2 - 3.5 Final     No results found for: POCINR      Current Outpatient Medications:   •  warfarin, Take one to two tablets by mouth daily (in addition to 1mg tabs) or as directed by anticoagulation clinic  •  warfarin, Take three tablets by mouth daily (in addition to 5mg tabs) or as directed by anticoagulation clinic  •  amLODIPine, TAKE 1 TABLET BY MOUTH DAILY  BRING ALL MEDICATIONS TO EVERY APPOINTMENT  •  KETOPROFEN, 0.5 Inch, Does not apply, PRN  •  lisinopril, 20 mg, Oral, DAILY  •  lovastatin, 20 mg, Oral, Nightly  •  tizanidine, 2 mg, Oral, TID  •  amitriptyline, 25 mg, Oral, Nightly  •  Norco, 1-2 Tablet, Oral, Q6HRS PRN  •  Lidoderm, 1 Patch, Transdermal, Q24HRS    [facility] of [facility] and V[facility] Health  Phone: [phone], Fax: [phone]      ADDENDUM:  Patient called the clinic back today to inform us that she actually got an INR drawn within the last few weeks at [facility]. She reports she will need a new standing order.   She also said that she was currently admitted at [facility] for COVID infection. She is unsure when she will be discharged. Reports last INR was therapeutic as inpatient. Requested the patient call the clinic upon discharge so we can resume monitoring her anticoagulation.     Jenise Angel  PharmD

## 2021-11-08 ENCOUNTER — DOCUMENTATION (OUTPATIENT)
Dept: VASCULAR LAB | Facility: MEDICAL CENTER | Age: 66
End: 2021-11-08

## 2021-11-08 NOTE — PROGRESS NOTES
Received a call from a  from [facility]. Leta has changed insurance insurance to [employer] which our clinic does not accept. The  is trying to find another coumadin clinic to manage that accepts Leta's insurance.     Pharmacy team to call Leta and ensure that she was able to establish with another clinic that is in her network tomorrow.    Navin Portillo, PharmD

## 2021-11-11 ENCOUNTER — TELEPHONE (OUTPATIENT)
Dept: VASCULAR LAB | Facility: MEDICAL CENTER | Age: 66
End: 2021-11-11

## 2021-11-30 DIAGNOSIS — Z79.01 CHRONIC ANTICOAGULATION: Primary | ICD-10-CM

## 2021-12-21 ENCOUNTER — DOCUMENTATION (OUTPATIENT)
Dept: VASCULAR LAB | Facility: MEDICAL CENTER | Age: 66
End: 2021-12-21

## 2021-12-21 NOTE — LETTER
December 21, 2021        Leta Gillette,    We are writing because the phone number we have on file is no longer working.   The last contact we had with you appears to indicate that you changed insurance carriers and is one that is not accepted at [facility] and you were trying to establish care with a new provider.   We wanted to ensure that your anticoagulation with warfarin is being managed by a new practice and you are being monitored accordingly.   Please call and update us on your current status for continuity of care.     Thank you.   [facility]   [place] [facility]

## 2021-12-21 NOTE — PROGRESS NOTES
[facility] [place] [facility]      Pt is over due for PT/INR for warfarin monitoring.   Attempted to call the patient to have INR checked ASAP, but phone number on file is disconnected.   Appears she may have changed insurance carriers and her current insurance is no longer accepted by out clinic, but wanted to follow up with the patient and ensure she is being managed by another practice before discharging her from our services.   Will send her a letter.            Jenise Angel  PharmD

## 2022-01-05 ENCOUNTER — ANTICOAGULATION MONITORING (OUTPATIENT)
Dept: VASCULAR LAB | Facility: MEDICAL CENTER | Age: 67
End: 2022-01-05

## 2022-01-05 ENCOUNTER — DOCUMENTATION (OUTPATIENT)
Dept: VASCULAR LAB | Facility: MEDICAL CENTER | Age: 67
End: 2022-01-05

## 2022-01-05 DIAGNOSIS — Z79.01 CHRONIC ANTICOAGULATION: ICD-10-CM

## 2022-01-05 NOTE — Clinical Note
Unable to contact pt by phone, emergency contacts, or mail.  Discharged from anticoagulation clinic until we hear back from the pt.     Sivakumar

## 2022-01-05 NOTE — PROGRESS NOTES
[facility]    Letter that was sent to the pt for compliance was returned.  Will defer care back to cardiology unless pt contacts the clinic in the meantime.     Sivakumar Johns, PharmD     CC  Dr Rowan Dr Bloch

## 2022-01-07 ENCOUNTER — ANTICOAGULATION MONITORING (OUTPATIENT)
Dept: VASCULAR LAB | Facility: MEDICAL CENTER | Age: 67
End: 2022-01-07

## 2022-01-07 LAB — INR PPP: 2.5 (ref 2–3.5)

## 2022-01-12 ENCOUNTER — ANTICOAGULATION MONITORING (OUTPATIENT)
Dept: VASCULAR LAB | Facility: MEDICAL CENTER | Age: 67
End: 2022-01-12

## 2022-01-12 ENCOUNTER — TELEPHONE (OUTPATIENT)
Dept: VASCULAR LAB | Facility: MEDICAL CENTER | Age: 67
End: 2022-01-12

## 2022-01-12 DIAGNOSIS — I82.409 DEEP VEIN THROMBOSIS (DVT) OF LOWER EXTREMITY, UNSPECIFIED CHRONICITY, UNSPECIFIED LATERALITY, UNSPECIFIED VEIN (HCC): ICD-10-CM

## 2022-01-12 DIAGNOSIS — Z79.01 CHRONIC ANTICOAGULATION: ICD-10-CM

## 2022-01-12 NOTE — TELEPHONE ENCOUNTER
Pt called stating that she had her INR drawn at [facility] in [place] on 1/7/22.    Called and requested most recent INR be faxed to [facility]. Will f/u w/ pt upon receipt of result.    Kodi Allen, CorazonD, BCACP

## 2022-01-12 NOTE — PROGRESS NOTES
Attempted to contact pt regarding therapeutic INR  LVM to return my call to confirm warfarin dosing as pt was previously d/c'd for noncompliance    Cony Huddleston, CorazonD

## 2022-01-13 NOTE — PROGRESS NOTES
2nd call     Attempted to contact pt regarding therapeutic INR  LVM to return my call to confirm warfarin dosing as pt was previously d/c'd for noncompliance     Cony Huddleston, CorazonD

## 2022-01-14 NOTE — PROGRESS NOTES
OP   Telephone Anticoagulation Service Note      Anticoagulation Summary  As of 2022    INR goal:  2.0-3.0   TTR:  --   INR used for dosin.50 (2022)   Warfarin maintenance plan:  5 mg (5 mg x 1) every day   Weekly warfarin total:  35 mg   Plan last modified:  Karime Angel (2022)   Next INR check:  2022   Target end date:  Indefinite    Indications    Chronic anticoagulation [Z79.01]  DVT (deep venous thrombosis) (HCC) [I82.409]             Anticoagulation Episode Summary     INR check location:      Preferred lab:      Send INR reminders to:      Comments:          Anticoagulation Patient Findings  Patient Findings     Negatives:  Signs/symptoms of thrombosis, Signs/symptoms of bleeding, Laboratory test error suspected, Change in health, Change in alcohol use, Change in activity, Upcoming invasive procedure, Emergency department visit, Upcoming dental procedure, Missed doses, Extra doses, Change in medications, Change in diet/appetite, Hospital admission, Bruising, Other complaints          Spoke with the patient on the phone today, reporting a therapeutic INR of 2.5.   Confirmed the current warfarin dosing regimen and patient compliance.  Patient reports she is taking 5mg QD.   Patient denies any interval changes to diet and/or medications. Patient denies any signs/symptoms of bleeding or clotting.  Patient instructed to continue with the current warfarin dosing regimen, and asked to follow up again in 2 weeks, but patient reports she will not test again for 4 weeks.     Jenise ChandraD

## 2022-02-22 DIAGNOSIS — Z79.01 CHRONIC ANTICOAGULATION: ICD-10-CM

## 2022-02-22 RX ORDER — WARFARIN SODIUM 5 MG/1
TABLET ORAL
Qty: 90 TABLET | Refills: 1 | Status: SHIPPED | OUTPATIENT
Start: 2022-02-22 | End: 2022-06-15

## 2022-03-10 ENCOUNTER — TELEPHONE (OUTPATIENT)
Dept: VASCULAR LAB | Facility: MEDICAL CENTER | Age: 67
End: 2022-03-10

## 2022-03-24 ENCOUNTER — TELEPHONE (OUTPATIENT)
Dept: VASCULAR LAB | Facility: MEDICAL CENTER | Age: 67
End: 2022-03-24

## 2022-03-24 NOTE — LETTER
Leta Alegria  [address]  [address]  [place] [address]    03/24/22    Dear Leta Alegria ,    We have been unsuccessful in our attempts to contact you regarding your Anticoagulation Service appointments. Warfarin is a potent blood-thinning agent that requires monitoring to ensure that the dosage is correct for your body.  If it isn't, you could develop serious, sometimes life-threatening bleeding problems or life-threatening blood clots or stroke could result.    To monitor you effectively, we need to be able to communicate with you.  This is a requirement to be followed by our Service.       If you repeatedly fail to keep your lab appointments, you are at risk of being discharged from the Anticoagulation Service.    It is extremely important that you contact the clinic as soon as possible to arrange appropriate follow up.  We are open Monday-Friday 8 am until 5 pm.  You may reach our Service at [phone].           Sincerely,           Sivakumar Johns, PharmD, [facility]  Clinic Supervisor  [facility]  Outpatient Anticoagulation Service

## 2022-03-24 NOTE — TELEPHONE ENCOUNTER
Left message for pt to have INR checked  2nd call  Letter sent  Marissa Nayak, Clinical Pharmacist, CDE, CACP

## 2022-04-14 ENCOUNTER — ANTICOAGULATION MONITORING (OUTPATIENT)
Dept: VASCULAR LAB | Facility: MEDICAL CENTER | Age: 67
End: 2022-04-14

## 2022-04-14 DIAGNOSIS — Z79.01 CHRONIC ANTICOAGULATION: ICD-10-CM

## 2022-04-14 LAB — INR PPP: 1.8 (ref 2–3.5)

## 2022-04-14 NOTE — PROGRESS NOTES
Anticoagulation Summary  As of 2022    INR goal:  2.0-3.0   TTR:  43.4 % (2.7 mo)   INR used for dosin.80 (2022)   Warfarin maintenance plan:  7.5 mg (5 mg x 1.5) every Mon, Fri; 5 mg (5 mg x 1) all other days   Weekly warfarin total:  40 mg   Plan last modified:  Sivakumar Johns, PharmD (2022)   Next INR check:  2022   Target end date:  Indefinite    Indications    Chronic anticoagulation [Z79.01]  DVT (deep venous thrombosis) (HCC) [I82.409]             Anticoagulation Episode Summary     INR check location:      Preferred lab:      Send INR reminders to:      Comments:          Anticoagulation Patient Findings          HPI:  Leta Alegria, on anticoagulation therapy with warfarin for DVT.  Changes to current medical/health status since last appt: none  Denies signs/symptoms of bleeding and/or thrombosis since the last appt.    Denies any interval changes to diet  Denies any interval changes to medications since last appt.   Denies any complications or cost restrictions with current therapy.     A/P   INR  SUB-therapeutic.   Begin 14% increased regimen.     Next INR in 1 week(s).    Sivakumar Johns, PharmD

## 2022-05-05 ENCOUNTER — TELEPHONE (OUTPATIENT)
Dept: VASCULAR LAB | Facility: MEDICAL CENTER | Age: 67
End: 2022-05-05

## 2022-05-12 ENCOUNTER — ANTICOAGULATION MONITORING (OUTPATIENT)
Dept: VASCULAR LAB | Facility: MEDICAL CENTER | Age: 67
End: 2022-05-12

## 2022-05-12 DIAGNOSIS — Z79.01 CHRONIC ANTICOAGULATION: ICD-10-CM

## 2022-05-12 LAB — INR PPP: 2 (ref 2–3.5)

## 2022-05-12 NOTE — PROGRESS NOTES
Anticoagulation Summary  As of 2022    INR goal:  2.0-3.0   TTR:  32.7 % (3.7 mo)   INR used for dosin.00 (2022)   Warfarin maintenance plan:  7.5 mg (5 mg x 1.5) every Mon, Fri; 5 mg (5 mg x 1) all other days   Weekly warfarin total:  40 mg   Plan last modified:  Sivakumar Johns, PharmD (2022)   Next INR check:     Target end date:  Indefinite    Indications    Chronic anticoagulation [Z79.01]  DVT (deep venous thrombosis) (HCC) [I82.409]             Anticoagulation Episode Summary     INR check location:      Preferred lab:      Send INR reminders to:      Comments:          Anticoagulation Patient Findings         Spoke with patient to report a therapeutic INR of 2.0.  Pt to continue with current warfarin dosing regimen. Requested pt contact the clinic for any s/s of unusual bleeding, bruising, clotting or any changes to diet or medication. FU 1 weeks.    Palmer Tijerina Ass't    I have reviewed and concur with the above plan.       Current Outpatient Medications:   •  warfarin, Take one to two tablets by mouth daily (in addition to 1mg tabs) or as directed by anticoagulation clinic  •  warfarin, Take three tablets by mouth daily (in addition to 5mg tabs) or as directed by anticoagulation clinic  •  amLODIPine, TAKE 1 TABLET BY MOUTH DAILY  BRING ALL MEDICATIONS TO EVERY APPOINTMENT  •  KETOPROFEN, 0.5 Inch, Does not apply, PRN  •  lisinopril, 20 mg, Oral, DAILY  •  lovastatin, 20 mg, Oral, Nightly  •  tizanidine, 2 mg, Oral, TID  •  amitriptyline, 25 mg, Oral, Nightly  •  Norco, 1-2 Tablet, Oral, Q6HRS PRN  •  Lidoderm, 1 Patch, Transdermal, Q24HRS    Chele Matias, PharmD, MS, BCACP, [facility]  Phone: [phone],  Fax: [phone]  On call: [phone]

## 2022-05-26 LAB — INR PPP: 1.8 (ref 2–3.5)

## 2022-05-27 ENCOUNTER — ANTICOAGULATION MONITORING (OUTPATIENT)
Dept: VASCULAR LAB | Facility: MEDICAL CENTER | Age: 67
End: 2022-05-27

## 2022-05-27 DIAGNOSIS — Z79.01 CHRONIC ANTICOAGULATION: ICD-10-CM

## 2022-05-27 NOTE — PROGRESS NOTES
Anticoagulation Summary  As of 2022    INR goal:  2.0-3.0   TTR:  29.0 % (4.1 mo)   INR used for dosin.80 (2022)   Warfarin maintenance plan:  7.5 mg (5 mg x 1.5) every Mon, Wed, Fri; 5 mg (5 mg x 1) all other days   Weekly warfarin total:  42.5 mg   Plan last modified:  Corazon JamisonD (2022)   Next INR check:  2022   Target end date:  Indefinite    Indications    Chronic anticoagulation [Z79.01]  DVT (deep venous thrombosis) (HCC) [I82.409]             Anticoagulation Episode Summary     INR check location:      Preferred lab:      Send INR reminders to:      Comments:          Anticoagulation Patient Findings     Tried calling patient but no response. Left voicemail instructing the patient to call us if any signs of bleeding or bruising or any recent changes in diet or medications. Patient's INR is sub therapeutic at 1.8.    Patient instructed to increase weekly regimen by ~7 %     Follow-up in 2 week(s).      Humble Denton, Pharm.D, BC-ACP, BC-ADM

## 2022-06-22 ENCOUNTER — TELEPHONE (OUTPATIENT)
Dept: VASCULAR LAB | Facility: MEDICAL CENTER | Age: 67
End: 2022-06-22

## 2022-07-06 ENCOUNTER — TELEPHONE (OUTPATIENT)
Dept: VASCULAR LAB | Facility: MEDICAL CENTER | Age: 67
End: 2022-07-06

## 2022-07-06 NOTE — LETTER
Leta Alegria  [address]  [address]  [place] [address]    07/06/22    Dear Leta Alegria ,    We have been unsuccessful in our attempts to contact you regarding your Anticoagulation Service appointments. Warfarin is a potent blood-thinning agent that requires monitoring to ensure that the dosage is correct for your body.  If it isn't, you could develop serious, sometimes life-threatening bleeding problems or life-threatening blood clots or stroke could result.    To monitor you effectively, we need to be able to communicate with you.  This is a requirement to be followed by our Service.       If you repeatedly fail to keep your lab appointments, you are at risk of being discharged from the Anticoagulation Service.    It is extremely important that you contact the clinic as soon as possible to arrange appropriate follow up.  We are open Monday-Friday 8 am until 5 pm.  You may reach our Service at [phone].           Sincerely,           Sivakumar Johns, PharmD, [facility]  Clinic Supervisor  [facility]  Outpatient Anticoagulation Service

## 2022-07-08 ENCOUNTER — ANTICOAGULATION MONITORING (OUTPATIENT)
Dept: VASCULAR LAB | Facility: MEDICAL CENTER | Age: 67
End: 2022-07-08

## 2022-07-08 DIAGNOSIS — Z79.01 CHRONIC ANTICOAGULATION: ICD-10-CM

## 2022-07-08 LAB — INR PPP: 2.2 (ref 2–3.5)

## 2022-07-08 NOTE — PROGRESS NOTES
Anticoagulation Summary  As of 2022    INR goal:  2.0-3.0   TTR:  34.7 % (5.6 mo)   INR used for dosin.20 (2022)   Warfarin maintenance plan:  7.5 mg (5 mg x 1.5) every Mon, Wed, Fri; 5 mg (5 mg x 1) all other days   Weekly warfarin total:  42.5 mg   Plan last modified:  Corazon JamisonD (2022)   Next INR check:  2022   Target end date:  Indefinite    Indications    Chronic anticoagulation [Z79.01]  DVT (deep venous thrombosis) (HCC) [I82.409]             Anticoagulation Episode Summary     INR check location:      Preferred lab:      Send INR reminders to:      Comments:          Anticoagulation Patient Findings      Left voicemail to report a therapeutic INR of 2.20.      Pt to continue with current warfarin dosing regimen. Requested pt contact the clinic for any s/s of unusual bleeding, bruising, clotting or any changes to diet or medication.    FU INR in 3 week(s).    Katalina Riley, T      I have reviewed and concur with the above plan.       Current Outpatient Medications:   •  warfarin, TAKE 1 TO 2 TABLETS BY MOUTH ONCE DAILY (IN  ADDITION  TO  1MG  TABLETS)  OR  AS  DIRECTED  BY  ANTICOAGULATION  CLINIC.  •  warfarin, Take three tablets by mouth daily (in addition to 5mg tabs) or as directed by anticoagulation clinic  •  amLODIPine, TAKE 1 TABLET BY MOUTH DAILY  BRING ALL MEDICATIONS TO EVERY APPOINTMENT  •  KETOPROFEN, 0.5 Inch, Does not apply, PRN  •  lisinopril, 20 mg, Oral, DAILY  •  lovastatin, 20 mg, Oral, Nightly  •  tizanidine, 2 mg, Oral, TID  •  amitriptyline, 25 mg, Oral, Nightly  •  Norco, 1-2 Tablet, Oral, Q6HRS PRN  •  Lidoderm, 1 Patch, Transdermal, Q24HRS    Chele Matias, PharmD, MS, BCACP, [facility]  Phone: [phone],  Fax: [phone]  On call: [phone]

## 2022-08-11 ENCOUNTER — TELEPHONE (OUTPATIENT)
Dept: VASCULAR LAB | Facility: MEDICAL CENTER | Age: 67
End: 2022-08-11

## 2022-08-11 NOTE — TELEPHONE ENCOUNTER
Left message for pt to have INR checked  Marissa Filter, Clinical Pharmacist, CDE, CACP  Managed Care Pharmacist for [facility] and [facility]

## 2022-08-26 ENCOUNTER — ANTICOAGULATION MONITORING (OUTPATIENT)
Dept: MEDICAL GROUP | Facility: PHYSICIAN GROUP | Age: 67
End: 2022-08-26

## 2022-08-26 DIAGNOSIS — Z79.01 CHRONIC ANTICOAGULATION: ICD-10-CM

## 2022-08-26 LAB — INR PPP: 2.2 (ref 2–3.5)

## 2022-11-16 DIAGNOSIS — Z79.01 CHRONIC ANTICOAGULATION: ICD-10-CM

## 2022-11-16 NOTE — PROGRESS NOTES
Updated standing order for INR sent to [facility] lab.  Rommel Randall, PharmD, BCACP     None

## 2022-11-22 ENCOUNTER — ANTICOAGULATION MONITORING (OUTPATIENT)
Dept: VASCULAR LAB | Facility: MEDICAL CENTER | Age: 67
End: 2022-11-22

## 2022-11-22 DIAGNOSIS — Z79.01 CHRONIC ANTICOAGULATION: Chronic | ICD-10-CM

## 2022-11-22 LAB — INR PPP: 2.6 (ref 2–3.5)

## 2023-01-24 DIAGNOSIS — Z79.01 CHRONIC ANTICOAGULATION: ICD-10-CM

## 2023-01-24 RX ORDER — WARFARIN SODIUM 5 MG/1
TABLET ORAL
Qty: 135 TABLET | Refills: 0 | Status: SHIPPED | OUTPATIENT
Start: 2023-01-24 | End: 2023-06-06 | Stop reason: SDUPTHER

## 2023-03-01 ENCOUNTER — TELEPHONE (OUTPATIENT)
Dept: VASCULAR LAB | Facility: MEDICAL CENTER | Age: 68
End: 2023-03-01

## 2023-03-09 LAB — INR PPP: 2.2 (ref 2–3.5)

## 2023-03-14 ENCOUNTER — ANTICOAGULATION MONITORING (OUTPATIENT)
Dept: CARDIOLOGY | Facility: MEDICAL CENTER | Age: 68
End: 2023-03-14

## 2023-03-14 DIAGNOSIS — Z79.01 CHRONIC ANTICOAGULATION: Chronic | ICD-10-CM

## 2023-03-14 NOTE — PROGRESS NOTES
Anticoagulation Summary  As of 3/14/2023      INR goal:  2.0-3.0   TTR:  73.5 % (1.1 y)   INR used for dosin.20 (3/9/2023)   Warfarin maintenance plan:  7.5 mg (5 mg x 1.5) every Mon, Wed, Fri; 5 mg (5 mg x 1) all other days   Weekly warfarin total:  42.5 mg   Plan last modified:  Humble Denton, PharmD (2022)   Next INR check:  2023   Target end date:  Indefinite    Indications    Chronic anticoagulation [Z79.01]  DVT (deep venous thrombosis) (HCC) [I82.409]                 Anticoagulation Episode Summary       INR check location:      Preferred lab:      Send INR reminders to:      Comments:            Anticoagulation Patient Findings    Left voicemail message to report a therapeutic INR.      Pt to continue with current warfarin dosing regimen. Requested pt contact the clinic for any s/s of unusual bleeding, bruising, clotting or any changes to diet or medication.    FU INR in 5 week(s).    Claire Aguilar, Pharmacy Intern

## 2023-06-06 DIAGNOSIS — Z79.01 CHRONIC ANTICOAGULATION: ICD-10-CM

## 2023-06-07 RX ORDER — WARFARIN SODIUM 5 MG/1
TABLET ORAL
Qty: 45 TABLET | Refills: 0 | Status: SHIPPED | OUTPATIENT
Start: 2023-06-07 | End: 2023-07-03 | Stop reason: SDUPTHER

## 2023-09-06 DIAGNOSIS — Z79.01 CHRONIC ANTICOAGULATION: ICD-10-CM

## 2023-09-06 RX ORDER — WARFARIN SODIUM 5 MG/1
TABLET ORAL
Qty: 45 TABLET | Refills: 0 | OUTPATIENT
Start: 2023-09-06

## 2023-09-11 ENCOUNTER — DOCUMENTATION (OUTPATIENT)
Dept: VASCULAR LAB | Facility: MEDICAL CENTER | Age: 68
End: 2023-09-11

## 2023-09-11 NOTE — PROGRESS NOTES
[facility]      Patient called the clinic today and requested a refill on her warfarin.   Patient is long overdue to have INR tested and informed her that we would need to get a current INR before we are able to refill her medication.     [place] and has a HM she has been using, but does not know how to send us the results.   She reports she will go to the lab on Wednesday.  Will follow up with the patient again once those results are received.       Jenise Angel  PharmD

## 2023-09-14 LAB — INR PPP: 2.1 (ref 2–3.5)

## 2023-09-15 ENCOUNTER — TELEPHONE (OUTPATIENT)
Dept: VASCULAR LAB | Facility: MEDICAL CENTER | Age: 68
End: 2023-09-15

## 2023-09-15 ENCOUNTER — ANTICOAGULATION MONITORING (OUTPATIENT)
Dept: VASCULAR LAB | Facility: MEDICAL CENTER | Age: 68
End: 2023-09-15

## 2023-09-15 DIAGNOSIS — Z79.01 CHRONIC ANTICOAGULATION: Chronic | ICD-10-CM

## 2023-09-15 RX ORDER — WARFARIN SODIUM 1 MG/1
TABLET ORAL
Qty: 30 TABLET | Refills: 0 | Status: SHIPPED | OUTPATIENT
Start: 2023-09-15

## 2023-09-15 RX ORDER — WARFARIN SODIUM 5 MG/1
TABLET ORAL
Qty: 90 TABLET | Refills: 0 | Status: SHIPPED | OUTPATIENT
Start: 2023-09-15 | End: 2023-09-18 | Stop reason: SDUPTHER

## 2023-09-15 NOTE — PROGRESS NOTES
Anticoagulation Summary  As of 9/15/2023      INR goal:  2.0-3.0   TTR:  81.7 % (1.6 y)   INR used for dosin.10 (2023)   Warfarin maintenance plan:  5 mg (5 mg x 1) every day   Weekly warfarin total:  35 mg   Plan last modified:  Claire Whitman PharmD (9/15/2023)   Next INR check:  10/27/2023   Target end date:  Indefinite    Indications    Chronic anticoagulation [Z79.01]  DVT (deep venous thrombosis) (HCC) [I82.409]                 Anticoagulation Episode Summary       INR check location:      Preferred lab:      Send INR reminders to:      Comments:              Refer to Anticoagulation Patient Findings for HPI  Patient Findings       Negatives:  Signs/symptoms of thrombosis, Signs/symptoms of bleeding, Laboratory test error suspected, Change in health, Change in alcohol use, Change in activity, Upcoming invasive procedure, Emergency department visit, Upcoming dental procedure, Missed doses, Extra doses, Change in medications, Change in diet/appetite, Hospital admission, Bruising, Other complaints            Spoke with patient to report a therapeutic INR.        Pt instructed to continue with current warfarin dosing regimen, confirms dosing.   Will follow up in 6 week(s).     Claire Whitman PharmD

## 2023-09-18 ENCOUNTER — ANTICOAGULATION MONITORING (OUTPATIENT)
Dept: VASCULAR LAB | Facility: MEDICAL CENTER | Age: 68
End: 2023-09-18

## 2023-09-18 DIAGNOSIS — Z79.01 CHRONIC ANTICOAGULATION: Chronic | ICD-10-CM

## 2023-09-18 RX ORDER — WARFARIN SODIUM 5 MG/1
TABLET ORAL
Qty: 90 TABLET | Refills: 0 | Status: SHIPPED | OUTPATIENT
Start: 2023-09-18 | End: 2023-09-19 | Stop reason: SDUPTHER

## 2023-09-19 DIAGNOSIS — Z79.01 CHRONIC ANTICOAGULATION: Chronic | ICD-10-CM

## 2023-09-19 RX ORDER — WARFARIN SODIUM 5 MG/1
TABLET ORAL
Qty: 90 TABLET | Refills: 0 | Status: SHIPPED | OUTPATIENT
Start: 2023-09-19 | End: 2023-12-12 | Stop reason: SDUPTHER

## 2023-12-12 ENCOUNTER — TELEPHONE (OUTPATIENT)
Dept: VASCULAR LAB | Facility: MEDICAL CENTER | Age: 68
End: 2023-12-12

## 2023-12-12 DIAGNOSIS — Z79.01 CHRONIC ANTICOAGULATION: Primary | Chronic | ICD-10-CM

## 2023-12-12 RX ORDER — WARFARIN SODIUM 5 MG/1
TABLET ORAL
Qty: 35 TABLET | Refills: 0 | Status: SHIPPED | OUTPATIENT
Start: 2023-12-12 | End: 2023-12-19 | Stop reason: SDUPTHER

## 2023-12-12 NOTE — TELEPHONE ENCOUNTER
Patient is overdue for an INR check.  S/w pt - she states she will get her INR checked tomorrow. She is requesting for warfarin refill to be sent to her pharmacy as she only has 3 days left. Advised that I will send a short-term supply for now given that INR is overdue and we would like to ensure that current regimen is still appropriate.    Silverio James, CorazonD, BCACP

## 2023-12-19 RX ORDER — WARFARIN SODIUM 5 MG/1
TABLET ORAL
Qty: 35 TABLET | Refills: 0 | Status: SHIPPED | OUTPATIENT
Start: 2023-12-19

## 2023-12-19 NOTE — TELEPHONE ENCOUNTER
Caller:  Leta    Topic/issue:   Leta needs a new standing order to go to:   [facility] in [place].    She is also out of her:  warfarin (COUMADIN) 5 MG Tab [[id number]]  - She would like this to go to [facility]    Callback Number: [phone]    Thank you,   Jayne LONDON

## 2023-12-20 ENCOUNTER — TELEPHONE (OUTPATIENT)
Dept: VASCULAR LAB | Facility: MEDICAL CENTER | Age: 68
End: 2023-12-20

## 2023-12-20 DIAGNOSIS — Z79.01 CHRONIC ANTICOAGULATION: ICD-10-CM

## 2023-12-20 NOTE — TELEPHONE ENCOUNTER
Called and LVM for patient. Advised that Rx for warfarin was sent to [facility] last week and she can  Rx there. Otherwise, Rx also sent to [facility] per her request, but she will likely have to contact [facility] so that they can place Rx on hold and [facility] can submit Rx claim to her insurance.    Also advised that standing order was faxed to her lab of choice.    Provided call back number for any questions or concerns.    Silverio James, PharmD, BCACP

## 2023-12-20 NOTE — TELEPHONE ENCOUNTER
Caller: Susie from [facility]     Topic/issue: Susie reached out because they need a new INR order for the patient.     Please advise.     Callback Number: [phone] - Susie    Thank you,    Petty HIGH

## 2024-03-21 ENCOUNTER — TELEPHONE (OUTPATIENT)
Dept: VASCULAR LAB | Facility: MEDICAL CENTER | Age: 69
End: 2024-03-21

## 2024-03-22 NOTE — TELEPHONE ENCOUNTER
[facility]    Pt called asking for warfarin refill.  We haven't had an INR since Sept 2023.  We don't know what her INR has been recently.  She states she has a heart procedure with [facility] tomorrow.  She does not have a [facility] cardiologist nor a [facility] PCP.  I recommend she calls the [facility] cardiology group and/or anticoagulation clinic.  I provided her with the phone number to [facility] anticoagulation.    Pt may contact our clinic should she still have barriers. Pt cannot be managed by phone if she is actively managed by [facility] and has the ability to receive care through their services.     Sivakumar Johsn, PharmD

## 2024-11-18 ENCOUNTER — TELEPHONE (OUTPATIENT)
Dept: VASCULAR LAB | Facility: MEDICAL CENTER | Age: 69
End: 2024-11-18

## 2024-11-18 NOTE — TELEPHONE ENCOUNTER
Pt is overdue for INR    Left message to clarify if pt has established care with [facility] (see phone note 03/21/24)    There is no emergency contact on file    3rd attempt    Discharge criteria:  3 calls (including at least once to emergency contacts)? Yes  1 letter (with the third call)? Yes  Over 3 months (last seen 09/18/23, if no response after 3 calls + 1 letter, will d/c from clinic)? Yes    Pt has not responded to multiple attempts/calls to establish care. Advised to contact the clinic back should she decide to re-establish care. Will discharge from [facility].    Silverio James, PharmD    CC Sivakumar Johns, PharmD, Pcp Pt States None